# Patient Record
Sex: FEMALE | Race: OTHER | Employment: UNEMPLOYED | URBAN - NONMETROPOLITAN AREA
[De-identification: names, ages, dates, MRNs, and addresses within clinical notes are randomized per-mention and may not be internally consistent; named-entity substitution may affect disease eponyms.]

---

## 2020-02-10 ENCOUNTER — HOSPITAL ENCOUNTER (EMERGENCY)
Age: 55
Discharge: HOME | End: 2020-02-10
Payer: COMMERCIAL

## 2020-02-10 VITALS
HEIGHT: 64 IN | BODY MASS INDEX: 28.17 KG/M2 | SYSTOLIC BLOOD PRESSURE: 138 MMHG | OXYGEN SATURATION: 98 % | RESPIRATION RATE: 16 BRPM | WEIGHT: 165 LBS | TEMPERATURE: 98.6 F | DIASTOLIC BLOOD PRESSURE: 72 MMHG | HEART RATE: 92 BPM

## 2020-02-10 PROCEDURE — 99202 OFFICE O/P NEW SF 15 MIN: CPT

## 2020-02-10 PROCEDURE — 69209 REMOVE IMPACTED EAR WAX UNI: CPT

## 2020-02-10 PROCEDURE — 99202 OFFICE O/P NEW SF 15 MIN: CPT | Performed by: NURSE PRACTITIONER

## 2020-02-10 RX ORDER — LORATADINE 10 MG/1
10 TABLET ORAL DAILY PRN
Qty: 30 TABLET | Refills: 0 | Status: SHIPPED | OUTPATIENT
Start: 2020-02-10

## 2020-02-10 RX ORDER — AMOXICILLIN 500 MG/1
500 CAPSULE ORAL 3 TIMES DAILY
Qty: 30 CAPSULE | Refills: 0 | Status: ON HOLD
Start: 2020-02-10 | End: 2020-02-19 | Stop reason: HOSPADM

## 2020-02-10 RX ORDER — LISINOPRIL AND HYDROCHLOROTHIAZIDE 25; 20 MG/1; MG/1
1 TABLET ORAL DAILY
COMMUNITY

## 2020-02-10 RX ORDER — OFLOXACIN 3 MG/ML
10 SOLUTION AURICULAR (OTIC) 2 TIMES DAILY
Qty: 1 BOTTLE | Refills: 0 | Status: SHIPPED | OUTPATIENT
Start: 2020-02-10 | End: 2020-02-10 | Stop reason: SDUPTHER

## 2020-02-10 RX ORDER — OFLOXACIN 3 MG/ML
10 SOLUTION AURICULAR (OTIC) 2 TIMES DAILY
Qty: 1 BOTTLE | Refills: 0 | Status: SHIPPED | OUTPATIENT
Start: 2020-02-10 | End: 2020-02-20

## 2020-02-10 RX ORDER — FLUTICASONE PROPIONATE 50 MCG
1 SPRAY, SUSPENSION (ML) NASAL DAILY
Qty: 1 BOTTLE | Refills: 0 | Status: SHIPPED | OUTPATIENT
Start: 2020-02-10

## 2020-02-10 ASSESSMENT — ENCOUNTER SYMPTOMS
RHINORRHEA: 1
NAUSEA: 0
VOMITING: 0
TROUBLE SWALLOWING: 0
SINUS PRESSURE: 0
SORE THROAT: 0
ABDOMINAL PAIN: 0
CHEST TIGHTNESS: 0
STRIDOR: 0
SHORTNESS OF BREATH: 0
EYES NEGATIVE: 1
COUGH: 1
VOICE CHANGE: 0
SINUS PAIN: 0
WHEEZING: 0

## 2020-02-11 NOTE — ED NOTES
Right ear flush completed with no problems. Moderate amount of cerumen returned from right ear.      Eduardo Sandhu RN  02/10/20 1943

## 2020-02-11 NOTE — ED PROVIDER NOTES
CHANGED    Details   lisinopril-hydrochlorothiazide (PRINZIDE;ZESTORETIC) 20-25 MG per tablet Take 1 tablet by mouth dailyHistorical Med             ALLERGIES     Patient is has No Known Allergies. FAMILY HISTORY     Patient'sfamily history is not on file. SOCIAL HISTORY     Patient  reports that she is a non-smoker but has been exposed to tobacco smoke. She has never used smokeless tobacco. She reports current alcohol use. She reports that she does not use drugs. PHYSICAL EXAM     ED TRIAGE VITALS  BP: 138/72, Temp: 98.6 °F (37 °C), Pulse: 92, Resp: 16, SpO2: 98 %  Physical Exam  Vitals signs and nursing note reviewed. Constitutional:       General: She is not in acute distress. Appearance: Normal appearance. She is well-developed and well-groomed. She is not ill-appearing, toxic-appearing or diaphoretic. HENT:      Head: Normocephalic and atraumatic. Right Ear: Hearing, ear canal and external ear normal. Drainage (serous) present. No swelling or tenderness. No middle ear effusion. There is impacted cerumen (plug noted). No foreign body. No mastoid tenderness. No hemotympanum. Tympanic membrane is not injected, scarred, perforated, erythematous, retracted or bulging. Left Ear: Hearing, ear canal and external ear normal. No drainage, swelling or tenderness. A middle ear effusion is present. There is no impacted cerumen. No foreign body. No mastoid tenderness. Nose: Nose normal.      Right Sinus: No maxillary sinus tenderness or frontal sinus tenderness. Left Sinus: No maxillary sinus tenderness or frontal sinus tenderness. Mouth/Throat:      Lips: Pink. No lesions. Mouth: Mucous membranes are moist. No oral lesions. Tongue: No lesions. Palate: No lesions. Pharynx: Oropharynx is clear. Uvula midline. Posterior oropharyngeal erythema (mild) present. No pharyngeal swelling, oropharyngeal exudate or uvula swelling.       Tonsils: No tonsillar exudate or tonsillar abscesses. Swellin+ on the right. 2+ on the left. Eyes:      Conjunctiva/sclera: Conjunctivae normal.      Right eye: Right conjunctiva is not injected. Left eye: Left conjunctiva is not injected. Pupils: Pupils are equal.   Neck:      Musculoskeletal: Normal range of motion. Cardiovascular:      Rate and Rhythm: Normal rate and regular rhythm. Heart sounds: Normal heart sounds. Pulmonary:      Effort: Pulmonary effort is normal. No respiratory distress. Breath sounds: Normal breath sounds and air entry. No stridor, decreased air movement or transmitted upper airway sounds. No decreased breath sounds, wheezing, rhonchi or rales. Musculoskeletal:      Right knee: She exhibits normal range of motion. Left knee: She exhibits normal range of motion. Lymphadenopathy:      Cervical: Cervical adenopathy present. Right cervical: Deep cervical adenopathy present. Left cervical: Deep cervical adenopathy present. Skin:     General: Skin is warm and dry. Capillary Refill: Capillary refill takes less than 2 seconds. Coloration: Skin is not pale. Comments: No obvious signs of infection or trauma. Neurological:      Mental Status: She is alert and oriented to person, place, and time. Sensory: No sensory deficit. Psychiatric:         Behavior: Behavior normal. Behavior is cooperative. DIAGNOSTIC RESULTS   Labs:No results found for this visit on 02/10/20. IMAGING:  No orders to display     URGENT CARE COURSE:     Vitals:    02/10/20 1922 02/10/20 1924 02/10/20 1943   BP:  (!) 156/76 138/72   Pulse: 100  92   Resp: 16  16   Temp: 98.6 °F (37 °C)     TempSrc: Oral     SpO2: 98%  98%   Weight: 165 lb (74.8 kg)     Height: 5' 4\" (1.626 m)         Medications - No data to display  PROCEDURES:  Ear Wax Removal  Date/Time: 2/10/2020 7:42 PM  Performed by: Emilio Rivero RN  Authorized by:  IGNACIO Dodd - CNP     Consent:     Consent obtained:  Verbal and written    Consent given by:  Patient    Risks discussed:  Bleeding, dizziness, infection, incomplete removal, pain and TM perforation  Procedure details:     Location:  R ear    Procedure type: irrigation    Post-procedure details: Inspection:  TM intact and macerated skin (serous fluid noted)    Hearing quality:  Improved (states \" i can hear\". )    Patient tolerance of procedure: Tolerated well, no immediate complications      FINALIMPRESSION      1. Acute viral sinusitis    2. Infective otitis externa of right ear    3. Non-recurrent acute suppurative otitis media of right ear without spontaneous rupture of tympanic membrane    4. Impacted cerumen of right ear        DISPOSITION/PLAN   DISPOSITION    Dry ears  No qtips           Problem List Items Addressed This Visit     None      Visit Diagnoses     Acute viral sinusitis    -  Primary    Relevant Medications    amoxicillin (AMOXIL) 500 MG capsule    fluticasone (FLONASE) 50 MCG/ACT nasal spray    loratadine (CLARITIN) 10 MG tablet    Infective otitis externa of right ear        Relevant Medications    fluticasone (FLONASE) 50 MCG/ACT nasal spray    ofloxacin (FLOXIN) 0.3 % otic solution    Non-recurrent acute suppurative otitis media of right ear without spontaneous rupture of tympanic membrane        Relevant Medications    amoxicillin (AMOXIL) 500 MG capsule    Impacted cerumen of right ear        Relevant Medications    ofloxacin (FLOXIN) 0.3 % otic solution    Other Relevant Orders    Ear wax removal (Completed)    EAR CERUMEN REMOVAL (Completed)          PATIENT REFERRED TO:  [address] [facility] [address]  [address] [address] [address] [address]  Schedule an appointment as soon as possible for a visit in 1 week  if no improvement      Dain Rodriguez, IGNACIO - [facility], APRN - CNP  02/10/20 [id number]

## 2020-02-17 ENCOUNTER — APPOINTMENT (OUTPATIENT)
Dept: GENERAL RADIOLOGY | Age: 55
DRG: 392 | End: 2020-02-17
Payer: COMMERCIAL

## 2020-02-17 ENCOUNTER — APPOINTMENT (OUTPATIENT)
Dept: CT IMAGING | Age: 55
DRG: 392 | End: 2020-02-17
Payer: COMMERCIAL

## 2020-02-17 ENCOUNTER — HOSPITAL ENCOUNTER (EMERGENCY)
Age: 55
Discharge: HOME | DRG: 392 | End: 2020-02-17
Payer: COMMERCIAL

## 2020-02-17 VITALS
OXYGEN SATURATION: 98 % | WEIGHT: 165 LBS | DIASTOLIC BLOOD PRESSURE: 81 MMHG | HEIGHT: 64 IN | SYSTOLIC BLOOD PRESSURE: 138 MMHG | BODY MASS INDEX: 28.17 KG/M2 | RESPIRATION RATE: 20 BRPM | HEART RATE: 86 BPM | TEMPERATURE: 97.3 F

## 2020-02-17 LAB
ALBUMIN SERPL-MCNC: 4.3 G/DL (ref 3.5–5.1)
ALP BLD-CCNC: 102 U/L (ref 38–126)
ALT SERPL-CCNC: 27 U/L (ref 11–66)
AMORPHOUS: ABNORMAL
ANION GAP SERPL CALCULATED.3IONS-SCNC: 18 MEQ/L (ref 8–16)
AST SERPL-CCNC: 17 U/L (ref 5–40)
BACTERIA: ABNORMAL /HPF
BASOPHILS # BLD: 0.3 %
BASOPHILS ABSOLUTE: 0 THOU/MM3 (ref 0–0.1)
BILIRUB SERPL-MCNC: 0.4 MG/DL (ref 0.3–1.2)
BILIRUBIN URINE: NEGATIVE
BLOOD, URINE: ABNORMAL
BUN BLDV-MCNC: 15 MG/DL (ref 7–22)
CALCIUM SERPL-MCNC: 9.8 MG/DL (ref 8.5–10.5)
CASTS 2: ABNORMAL /LPF
CASTS UA: ABNORMAL /LPF
CHARACTER, URINE: ABNORMAL
CHLORIDE BLD-SCNC: 101 MEQ/L (ref 98–111)
CO2: 23 MEQ/L (ref 23–33)
COLOR: YELLOW
CREAT SERPL-MCNC: 0.6 MG/DL (ref 0.4–1.2)
CRYSTALS, UA: ABNORMAL
EKG ATRIAL RATE: 86 BPM
EKG P AXIS: 55 DEGREES
EKG P-R INTERVAL: 178 MS
EKG Q-T INTERVAL: 400 MS
EKG QRS DURATION: 84 MS
EKG QTC CALCULATION (BAZETT): 478 MS
EKG R AXIS: 38 DEGREES
EKG T AXIS: 26 DEGREES
EKG VENTRICULAR RATE: 86 BPM
EOSINOPHIL # BLD: 0.4 %
EOSINOPHILS ABSOLUTE: 0 THOU/MM3 (ref 0–0.4)
EPITHELIAL CELLS, UA: ABNORMAL /HPF
ERYTHROCYTE [DISTWIDTH] IN BLOOD BY AUTOMATED COUNT: 13 % (ref 11.5–14.5)
ERYTHROCYTE [DISTWIDTH] IN BLOOD BY AUTOMATED COUNT: 42.1 FL (ref 35–45)
FLU A ANTIGEN: NEGATIVE
FLU B ANTIGEN: NEGATIVE
GFR SERPL CREATININE-BSD FRML MDRD: > 90 ML/MIN/1.73M2
GLUCOSE BLD-MCNC: 237 MG/DL (ref 70–108)
GLUCOSE URINE: >= 1000 MG/DL
HCT VFR BLD CALC: 43.9 % (ref 37–47)
HEMOCCULT STL QL: POSITIVE
HEMOGLOBIN: 14.7 GM/DL (ref 12–16)
IMMATURE GRANS (ABS): 0.05 THOU/MM3 (ref 0–0.07)
IMMATURE GRANULOCYTES: 0.5 %
KETONES, URINE: 40
LEUKOCYTE ESTERASE, URINE: NEGATIVE
LIPASE: 24.8 U/L (ref 5.6–51.3)
LYMPHOCYTES # BLD: 25.3 %
LYMPHOCYTES ABSOLUTE: 2.5 THOU/MM3 (ref 1–4.8)
MCH RBC QN AUTO: 30 PG (ref 26–33)
MCHC RBC AUTO-ENTMCNC: 33.5 GM/DL (ref 32.2–35.5)
MCV RBC AUTO: 89.6 FL (ref 81–99)
MISCELLANEOUS 2: ABNORMAL
MONOCYTES # BLD: 3 %
MONOCYTES ABSOLUTE: 0.3 THOU/MM3 (ref 0.4–1.3)
NITRITE, URINE: NEGATIVE
NUCLEATED RED BLOOD CELLS: 0 /100 WBC
OSMOLALITY CALCULATION: 291.6 MOSMOL/KG (ref 275–300)
PH UA: 5 (ref 5–9)
PLATELET # BLD: 310 THOU/MM3 (ref 130–400)
PMV BLD AUTO: 10 FL (ref 9.4–12.4)
POTASSIUM REFLEX MAGNESIUM: 3.8 MEQ/L (ref 3.5–5.2)
PRO-BNP: 17.6 PG/ML (ref 0–900)
PROTEIN UA: ABNORMAL
RBC # BLD: 4.9 MILL/MM3 (ref 4.2–5.4)
RBC URINE: ABNORMAL /HPF
RENAL EPITHELIAL, UA: ABNORMAL
SEG NEUTROPHILS: 70.5 %
SEGMENTED NEUTROPHILS ABSOLUTE COUNT: 7 THOU/MM3 (ref 1.8–7.7)
SODIUM BLD-SCNC: 142 MEQ/L (ref 135–145)
SPECIFIC GRAVITY, URINE: > 1.03 (ref 1–1.03)
TOTAL PROTEIN: 7.8 G/DL (ref 6.1–8)
TROPONIN T: < 0.01 NG/ML
TROPONIN T: < 0.01 NG/ML
UROBILINOGEN, URINE: 0.2 EU/DL (ref 0–1)
WBC # BLD: 9.9 THOU/MM3 (ref 4.8–10.8)
WBC UA: ABNORMAL /HPF
YEAST: ABNORMAL

## 2020-02-17 PROCEDURE — 6360000004 HC RX CONTRAST MEDICATION: Performed by: PHYSICIAN ASSISTANT

## 2020-02-17 PROCEDURE — 96375 TX/PRO/DX INJ NEW DRUG ADDON: CPT

## 2020-02-17 PROCEDURE — 82272 OCCULT BLD FECES 1-3 TESTS: CPT

## 2020-02-17 PROCEDURE — 6370000000 HC RX 637 (ALT 250 FOR IP): Performed by: PHYSICIAN ASSISTANT

## 2020-02-17 PROCEDURE — 80053 COMPREHEN METABOLIC PANEL: CPT

## 2020-02-17 PROCEDURE — 84484 ASSAY OF TROPONIN QUANT: CPT

## 2020-02-17 PROCEDURE — 36415 COLL VENOUS BLD VENIPUNCTURE: CPT

## 2020-02-17 PROCEDURE — 85025 COMPLETE CBC W/AUTO DIFF WBC: CPT

## 2020-02-17 PROCEDURE — 99285 EMERGENCY DEPT VISIT HI MDM: CPT

## 2020-02-17 PROCEDURE — 83690 ASSAY OF LIPASE: CPT

## 2020-02-17 PROCEDURE — 6360000002 HC RX W HCPCS: Performed by: PHYSICIAN ASSISTANT

## 2020-02-17 PROCEDURE — 87804 INFLUENZA ASSAY W/OPTIC: CPT

## 2020-02-17 PROCEDURE — 93005 ELECTROCARDIOGRAM TRACING: CPT | Performed by: PHYSICIAN ASSISTANT

## 2020-02-17 PROCEDURE — 2500000003 HC RX 250 WO HCPCS: Performed by: PHYSICIAN ASSISTANT

## 2020-02-17 PROCEDURE — 74177 CT ABD & PELVIS W/CONTRAST: CPT

## 2020-02-17 PROCEDURE — 83880 ASSAY OF NATRIURETIC PEPTIDE: CPT

## 2020-02-17 PROCEDURE — 81001 URINALYSIS AUTO W/SCOPE: CPT

## 2020-02-17 PROCEDURE — 71046 X-RAY EXAM CHEST 2 VIEWS: CPT

## 2020-02-17 PROCEDURE — 96374 THER/PROPH/DIAG INJ IV PUSH: CPT

## 2020-02-17 PROCEDURE — 2580000003 HC RX 258: Performed by: PHYSICIAN ASSISTANT

## 2020-02-17 RX ORDER — ONDANSETRON 2 MG/ML
4 INJECTION INTRAMUSCULAR; INTRAVENOUS ONCE
Status: COMPLETED | OUTPATIENT
Start: 2020-02-17 | End: 2020-02-17

## 2020-02-17 RX ORDER — SUCRALFATE 1 G/1
1 TABLET ORAL ONCE
Status: COMPLETED | OUTPATIENT
Start: 2020-02-17 | End: 2020-02-17

## 2020-02-17 RX ORDER — CIPROFLOXACIN 500 MG/1
500 TABLET, FILM COATED ORAL 2 TIMES DAILY
Qty: 20 TABLET | Refills: 0 | Status: SHIPPED | OUTPATIENT
Start: 2020-02-17 | End: 2020-02-27

## 2020-02-17 RX ORDER — METRONIDAZOLE 500 MG/1
500 TABLET ORAL ONCE
Status: COMPLETED | OUTPATIENT
Start: 2020-02-17 | End: 2020-02-17

## 2020-02-17 RX ORDER — DICYCLOMINE HYDROCHLORIDE 10 MG/1
10 CAPSULE ORAL EVERY 6 HOURS PRN
Qty: 20 CAPSULE | Refills: 0 | Status: SHIPPED | OUTPATIENT
Start: 2020-02-17

## 2020-02-17 RX ORDER — ONDANSETRON 4 MG/1
4 TABLET, ORALLY DISINTEGRATING ORAL EVERY 8 HOURS PRN
Qty: 20 TABLET | Refills: 0 | Status: SHIPPED | OUTPATIENT
Start: 2020-02-17

## 2020-02-17 RX ORDER — METRONIDAZOLE 500 MG/1
500 TABLET ORAL 2 TIMES DAILY
Qty: 20 TABLET | Refills: 0 | Status: SHIPPED | OUTPATIENT
Start: 2020-02-17 | End: 2020-02-27

## 2020-02-17 RX ORDER — SODIUM CHLORIDE 9 MG/ML
INJECTION, SOLUTION INTRAVENOUS CONTINUOUS
Status: DISCONTINUED | OUTPATIENT
Start: 2020-02-17 | End: 2020-02-17 | Stop reason: HOSPADM

## 2020-02-17 RX ORDER — LOPERAMIDE HYDROCHLORIDE 2 MG/1
2 CAPSULE ORAL 4 TIMES DAILY PRN
Qty: 20 CAPSULE | Refills: 0 | Status: SHIPPED | OUTPATIENT
Start: 2020-02-17 | End: 2020-02-22

## 2020-02-17 RX ORDER — CIPROFLOXACIN 500 MG/1
500 TABLET, FILM COATED ORAL ONCE
Status: COMPLETED | OUTPATIENT
Start: 2020-02-17 | End: 2020-02-17

## 2020-02-17 RX ORDER — 0.9 % SODIUM CHLORIDE 0.9 %
1000 INTRAVENOUS SOLUTION INTRAVENOUS ONCE
Status: COMPLETED | OUTPATIENT
Start: 2020-02-17 | End: 2020-02-17

## 2020-02-17 RX ORDER — DICYCLOMINE HYDROCHLORIDE 10 MG/1
10 CAPSULE ORAL ONCE
Status: COMPLETED | OUTPATIENT
Start: 2020-02-17 | End: 2020-02-17

## 2020-02-17 RX ADMIN — SODIUM CHLORIDE 1000 ML: 9 INJECTION, SOLUTION INTRAVENOUS at 14:33

## 2020-02-17 RX ADMIN — IOPAMIDOL 80 ML: 755 INJECTION, SOLUTION INTRAVENOUS at 16:07

## 2020-02-17 RX ADMIN — CIPROFLOXACIN 500 MG: 500 TABLET, FILM COATED ORAL at 17:38

## 2020-02-17 RX ADMIN — METRONIDAZOLE 500 MG: 500 TABLET, FILM COATED ORAL at 17:38

## 2020-02-17 RX ADMIN — SUCRALFATE 1 G: 1 TABLET ORAL at 14:39

## 2020-02-17 RX ADMIN — ONDANSETRON 4 MG: 2 INJECTION INTRAMUSCULAR; INTRAVENOUS at 14:34

## 2020-02-17 RX ADMIN — FAMOTIDINE 20 MG: 10 INJECTION, SOLUTION INTRAVENOUS at 14:37

## 2020-02-17 RX ADMIN — LIDOCAINE HYDROCHLORIDE: 20 SOLUTION ORAL; TOPICAL at 14:39

## 2020-02-17 RX ADMIN — DICYCLOMINE HYDROCHLORIDE 10 MG: 10 CAPSULE ORAL at 14:39

## 2020-02-17 ASSESSMENT — PAIN DESCRIPTION - PAIN TYPE
TYPE: ACUTE PAIN
TYPE: ACUTE PAIN

## 2020-02-17 ASSESSMENT — ENCOUNTER SYMPTOMS
RHINORRHEA: 0
SHORTNESS OF BREATH: 0
COLOR CHANGE: 0
NAUSEA: 1
CONSTIPATION: 0
BLOOD IN STOOL: 1
SORE THROAT: 0
WHEEZING: 0
BACK PAIN: 0
DIARRHEA: 1

## 2020-02-17 ASSESSMENT — PAIN DESCRIPTION - LOCATION: LOCATION: ABDOMEN

## 2020-02-17 NOTE — ED PROVIDER NOTES
Exam  Vitals signs and nursing note reviewed. Exam conducted with a chaperone present. Constitutional:       General: She is not in acute distress. Appearance: Normal appearance. She is well-developed. She is not ill-appearing, toxic-appearing or diaphoretic. HENT:      Head: Normocephalic and atraumatic. Right Ear: External ear normal.      Left Ear: External ear normal.      Nose: Nose normal.      Mouth/Throat:      Mouth: Mucous membranes are moist.      Pharynx: Oropharynx is clear. Eyes:      General: No scleral icterus. Right eye: No discharge. Left eye: No discharge. Conjunctiva/sclera: Conjunctivae normal.      Pupils: Pupils are equal, round, and reactive to light. Neck:      Musculoskeletal: Normal range of motion. Cardiovascular:      Rate and Rhythm: Normal rate and regular rhythm. Heart sounds: Normal heart sounds. No murmur. No friction rub. No gallop. Pulmonary:      Effort: Pulmonary effort is normal. No respiratory distress. Breath sounds: Normal breath sounds. No stridor. No wheezing, rhonchi or rales. Chest:      Chest wall: No tenderness. Abdominal:      General: Bowel sounds are normal. There is no distension. Palpations: Abdomen is soft. There is no mass. Tenderness: There is abdominal tenderness in the epigastric area. There is no guarding or rebound. Hernia: No hernia is present. Genitourinary:     Rectum: Guaiac result positive. No mass, tenderness, anal fissure, external hemorrhoid or internal hemorrhoid. Normal anal tone. Comments: There was no gross blood on the glove of the exam finger following the rectal exam.  Musculoskeletal: Normal range of motion. Skin:     General: Skin is warm and dry. Coloration: Skin is not pale. Findings: No erythema or rash. Neurological:      Mental Status: She is alert and oriented to person, place, and time. GCS: GCS eye subscore is 4.  GCS verbal subscore is Result   No acute disease. **This report has been created using voice recognition software. It may contain minor errors which are inherent in voice recognition technology. **      Final report electronically signed by Dr. Gege Hickman on 2/17/2020 3:21 PM          LABS:     Labs Reviewed   CBC WITH AUTO DIFFERENTIAL - Abnormal; Notable for the following components:       Result Value    Monocytes Absolute 0.3 (*)     All other components within normal limits   COMPREHENSIVE METABOLIC PANEL W/ REFLEX TO MG FOR LOW K - Abnormal; Notable for the following components:    Glucose 237 (*)     All other components within normal limits   ANION GAP - Abnormal; Notable for the following components:    Anion Gap 18.0 (*)     All other components within normal limits   URINE WITH REFLEXED MICRO - Abnormal; Notable for the following components:    Glucose, Ur >= 1000 (*)     Ketones, Urine 40 (*)     Specific Gravity, Urine > 1.030 (*)     Blood, Urine MODERATE (*)     Protein, UA TRACE (*)     Character, Urine TURBID (*)     All other components within normal limits   RAPID INFLUENZA A/B ANTIGENS   GASTROINTESTINAL PANEL, MOLECULAR   LIPASE   BLOOD OCCULT STOOL SCREEN #1   BRAIN NATRIURETIC PEPTIDE   TROPONIN   GLOMERULAR FILTRATION RATE, ESTIMATED   OSMOLALITY   TROPONIN       EMERGENCY DEPARTMENT COURSE:   Vitals:    Vitals:    02/17/20 1349 02/17/20 1353 02/17/20 1402 02/17/20 1521   BP: (!) 162/68  (!) 127/55 138/81   Pulse: 97   86   Resp: 18   20   Temp:  97.3 °F (36.3 °C)     TempSrc: Oral Oral     SpO2: 100%  98% 98%   Weight: 165 lb (74.8 kg)      Height: 5' 4\" (1.626 m)          MDM:  The patient was seen and evaluated within the ED today with abdominal pain, vomiting, and diarrhea. Within the department, I observed the patient's vital signs to be within acceptable range, and she was afebrile. On exam, I appreciated epigastric abdominal tenderness without guarding, rigidity, or rebound tenderness. AM    Provider:  I personally performed the services described in the documentation, reviewed and edited the documentation which was dictated to the scribe in my presence, and it accurately records my words and actions.     Krystal Danielson PA-C 2/17/20 9:18 AM        Krystal Danielson PA-C  02/18/20 9226

## 2020-02-17 NOTE — ED NOTES
ALESSIA Campos at bedside to update on POC and results. Pt to and from bathroom. Gait steady. Rprts all symptoms improved.       Misael Luna RN  02/17/20 8255

## 2020-02-17 NOTE — ED TRIAGE NOTES
Pt arrives to ED with c/o abdominal pain, vomiting and diarrhea x 1 week. Pt states she has not been able to keep anything down and has not taken any medications to treat her symptoms. Pt states she thinks she has food poisoning. Pt sitting in wheelchair, respirations unlabored. Family in room.

## 2020-02-18 ENCOUNTER — HOSPITAL ENCOUNTER (INPATIENT)
Age: 55
LOS: 1 days | Discharge: HOME HEALTH | DRG: 392 | End: 2020-02-19
Attending: EMERGENCY MEDICINE | Admitting: INTERNAL MEDICINE
Payer: COMMERCIAL

## 2020-02-18 ENCOUNTER — APPOINTMENT (OUTPATIENT)
Dept: CT IMAGING | Age: 55
DRG: 392 | End: 2020-02-18
Payer: COMMERCIAL

## 2020-02-18 PROBLEM — K52.9 COLITIS: Status: ACTIVE | Noted: 2020-02-18

## 2020-02-18 LAB
ALBUMIN SERPL-MCNC: 4.4 G/DL (ref 3.5–5.1)
ALP BLD-CCNC: 99 U/L (ref 38–126)
ALT SERPL-CCNC: 26 U/L (ref 11–66)
AMPHETAMINE+METHAMPHETAMINE URINE SCREEN: NEGATIVE
ANION GAP SERPL CALCULATED.3IONS-SCNC: 17 MEQ/L (ref 8–16)
APTT: 34.2 SECONDS (ref 22–38)
AST SERPL-CCNC: 21 U/L (ref 5–40)
BACTERIA: ABNORMAL /HPF
BARBITURATE QUANTITATIVE URINE: NEGATIVE
BASOPHILS # BLD: 0.2 %
BASOPHILS ABSOLUTE: 0 THOU/MM3 (ref 0–0.1)
BENZODIAZEPINE QUANTITATIVE URINE: NEGATIVE
BILIRUB SERPL-MCNC: 0.3 MG/DL (ref 0.3–1.2)
BILIRUBIN DIRECT: < 0.2 MG/DL (ref 0–0.3)
BILIRUBIN URINE: NEGATIVE
BLOOD, URINE: ABNORMAL
BUN BLDV-MCNC: 13 MG/DL (ref 7–22)
CALCIUM SERPL-MCNC: 9.3 MG/DL (ref 8.5–10.5)
CANNABINOID QUANTITATIVE URINE: POSITIVE
CASTS 2: ABNORMAL /LPF
CASTS UA: ABNORMAL /LPF
CHARACTER, URINE: CLEAR
CHLORIDE BLD-SCNC: 96 MEQ/L (ref 98–111)
CO2: 21 MEQ/L (ref 23–33)
COCAINE METABOLITE QUANTITATIVE URINE: NEGATIVE
COLOR: YELLOW
CREAT SERPL-MCNC: 0.6 MG/DL (ref 0.4–1.2)
CRYSTALS, UA: ABNORMAL
EKG ATRIAL RATE: 79 BPM
EKG P AXIS: 49 DEGREES
EKG P-R INTERVAL: 170 MS
EKG Q-T INTERVAL: 396 MS
EKG QRS DURATION: 84 MS
EKG QTC CALCULATION (BAZETT): 454 MS
EKG R AXIS: 46 DEGREES
EKG T AXIS: 37 DEGREES
EKG VENTRICULAR RATE: 79 BPM
EOSINOPHIL # BLD: 0.8 %
EOSINOPHILS ABSOLUTE: 0.1 THOU/MM3 (ref 0–0.4)
EPITHELIAL CELLS, UA: ABNORMAL /HPF
ERYTHROCYTE [DISTWIDTH] IN BLOOD BY AUTOMATED COUNT: 13.1 % (ref 11.5–14.5)
ERYTHROCYTE [DISTWIDTH] IN BLOOD BY AUTOMATED COUNT: 42.7 FL (ref 35–45)
ETHYL ALCOHOL, SERUM: < 0.01 %
GFR SERPL CREATININE-BSD FRML MDRD: > 90 ML/MIN/1.73M2
GLUCOSE BLD-MCNC: 264 MG/DL (ref 70–108)
GLUCOSE URINE: >= 1000 MG/DL
HCT VFR BLD CALC: 42.3 % (ref 37–47)
HEMOCCULT STL QL: NEGATIVE
HEMOGLOBIN: 14.4 GM/DL (ref 12–16)
IMMATURE GRANS (ABS): 0.05 THOU/MM3 (ref 0–0.07)
IMMATURE GRANULOCYTES: 0.4 %
INR BLD: 0.98 (ref 0.85–1.13)
KETONES, URINE: 80
LEUKOCYTE ESTERASE, URINE: NEGATIVE
LIPASE: 32.7 U/L (ref 5.6–51.3)
LYMPHOCYTES # BLD: 32.4 %
LYMPHOCYTES ABSOLUTE: 4 THOU/MM3 (ref 1–4.8)
MAGNESIUM: 1.8 MG/DL (ref 1.6–2.4)
MCH RBC QN AUTO: 30.4 PG (ref 26–33)
MCHC RBC AUTO-ENTMCNC: 34 GM/DL (ref 32.2–35.5)
MCV RBC AUTO: 89.4 FL (ref 81–99)
MISCELLANEOUS 2: ABNORMAL
MONOCYTES # BLD: 4.2 %
MONOCYTES ABSOLUTE: 0.5 THOU/MM3 (ref 0.4–1.3)
NITRITE, URINE: NEGATIVE
NUCLEATED RED BLOOD CELLS: 0 /100 WBC
OPIATES, URINE: NEGATIVE
OSMOLALITY CALCULATION: 277.5 MOSMOL/KG (ref 275–300)
OXYCODONE: NEGATIVE
PH UA: 5 (ref 5–9)
PHENCYCLIDINE QUANTITATIVE URINE: NEGATIVE
PLATELET # BLD: 333 THOU/MM3 (ref 130–400)
PMV BLD AUTO: 10.2 FL (ref 9.4–12.4)
POTASSIUM SERPL-SCNC: 3.5 MEQ/L (ref 3.5–5.2)
PRO-BNP: 19 PG/ML (ref 0–900)
PROTEIN UA: ABNORMAL
RBC # BLD: 4.73 MILL/MM3 (ref 4.2–5.4)
RBC URINE: ABNORMAL /HPF
RENAL EPITHELIAL, UA: ABNORMAL
SEG NEUTROPHILS: 62 %
SEGMENTED NEUTROPHILS ABSOLUTE COUNT: 7.6 THOU/MM3 (ref 1.8–7.7)
SODIUM BLD-SCNC: 134 MEQ/L (ref 135–145)
SPECIFIC GRAVITY, URINE: > 1.03 (ref 1–1.03)
TOTAL PROTEIN: 7.8 G/DL (ref 6.1–8)
TROPONIN T: < 0.01 NG/ML
UROBILINOGEN, URINE: 0.2 EU/DL (ref 0–1)
WBC # BLD: 12.2 THOU/MM3 (ref 4.8–10.8)
WBC UA: ABNORMAL /HPF
YEAST: ABNORMAL

## 2020-02-18 PROCEDURE — 80307 DRUG TEST PRSMV CHEM ANLYZR: CPT

## 2020-02-18 PROCEDURE — 2500000003 HC RX 250 WO HCPCS: Performed by: EMERGENCY MEDICINE

## 2020-02-18 PROCEDURE — 36415 COLL VENOUS BLD VENIPUNCTURE: CPT

## 2020-02-18 PROCEDURE — 6360000004 HC RX CONTRAST MEDICATION: Performed by: EMERGENCY MEDICINE

## 2020-02-18 PROCEDURE — 83735 ASSAY OF MAGNESIUM: CPT

## 2020-02-18 PROCEDURE — 1200000003 HC TELEMETRY R&B

## 2020-02-18 PROCEDURE — 99223 1ST HOSP IP/OBS HIGH 75: CPT | Performed by: INTERNAL MEDICINE

## 2020-02-18 PROCEDURE — 93005 ELECTROCARDIOGRAM TRACING: CPT | Performed by: EMERGENCY MEDICINE

## 2020-02-18 PROCEDURE — 6370000000 HC RX 637 (ALT 250 FOR IP): Performed by: INTERNAL MEDICINE

## 2020-02-18 PROCEDURE — 81001 URINALYSIS AUTO W/SCOPE: CPT

## 2020-02-18 PROCEDURE — G0480 DRUG TEST DEF 1-7 CLASSES: HCPCS

## 2020-02-18 PROCEDURE — 99285 EMERGENCY DEPT VISIT HI MDM: CPT

## 2020-02-18 PROCEDURE — 85025 COMPLETE CBC W/AUTO DIFF WBC: CPT

## 2020-02-18 PROCEDURE — 6360000002 HC RX W HCPCS: Performed by: INTERNAL MEDICINE

## 2020-02-18 PROCEDURE — 96361 HYDRATE IV INFUSION ADD-ON: CPT

## 2020-02-18 PROCEDURE — 84484 ASSAY OF TROPONIN QUANT: CPT

## 2020-02-18 PROCEDURE — 6370000000 HC RX 637 (ALT 250 FOR IP): Performed by: EMERGENCY MEDICINE

## 2020-02-18 PROCEDURE — 74177 CT ABD & PELVIS W/CONTRAST: CPT

## 2020-02-18 PROCEDURE — 85610 PROTHROMBIN TIME: CPT

## 2020-02-18 PROCEDURE — 6360000002 HC RX W HCPCS: Performed by: EMERGENCY MEDICINE

## 2020-02-18 PROCEDURE — 82248 BILIRUBIN DIRECT: CPT

## 2020-02-18 PROCEDURE — 96374 THER/PROPH/DIAG INJ IV PUSH: CPT

## 2020-02-18 PROCEDURE — 93010 ELECTROCARDIOGRAM REPORT: CPT | Performed by: INTERNAL MEDICINE

## 2020-02-18 PROCEDURE — 80053 COMPREHEN METABOLIC PANEL: CPT

## 2020-02-18 PROCEDURE — 2580000003 HC RX 258: Performed by: EMERGENCY MEDICINE

## 2020-02-18 PROCEDURE — 85730 THROMBOPLASTIN TIME PARTIAL: CPT

## 2020-02-18 PROCEDURE — 2580000003 HC RX 258: Performed by: INTERNAL MEDICINE

## 2020-02-18 PROCEDURE — 83880 ASSAY OF NATRIURETIC PEPTIDE: CPT

## 2020-02-18 PROCEDURE — 94760 N-INVAS EAR/PLS OXIMETRY 1: CPT

## 2020-02-18 PROCEDURE — 82272 OCCULT BLD FECES 1-3 TESTS: CPT

## 2020-02-18 PROCEDURE — 83690 ASSAY OF LIPASE: CPT

## 2020-02-18 RX ORDER — POTASSIUM CHLORIDE 20 MEQ/1
40 TABLET, EXTENDED RELEASE ORAL PRN
Status: DISCONTINUED | OUTPATIENT
Start: 2020-02-18 | End: 2020-02-19 | Stop reason: HOSPADM

## 2020-02-18 RX ORDER — HYDROCHLOROTHIAZIDE 25 MG/1
25 TABLET ORAL DAILY
Status: DISCONTINUED | OUTPATIENT
Start: 2020-02-18 | End: 2020-02-18

## 2020-02-18 RX ORDER — CIPROFLOXACIN 2 MG/ML
400 INJECTION, SOLUTION INTRAVENOUS ONCE
Status: DISCONTINUED | OUTPATIENT
Start: 2020-02-18 | End: 2020-02-18

## 2020-02-18 RX ORDER — 0.9 % SODIUM CHLORIDE 0.9 %
1000 INTRAVENOUS SOLUTION INTRAVENOUS ONCE
Status: COMPLETED | OUTPATIENT
Start: 2020-02-18 | End: 2020-02-18

## 2020-02-18 RX ORDER — PANTOPRAZOLE SODIUM 40 MG/1
40 TABLET, DELAYED RELEASE ORAL ONCE
Status: COMPLETED | OUTPATIENT
Start: 2020-02-18 | End: 2020-02-18

## 2020-02-18 RX ORDER — CETIRIZINE HYDROCHLORIDE 10 MG/1
5 TABLET ORAL DAILY
Status: DISCONTINUED | OUTPATIENT
Start: 2020-02-18 | End: 2020-02-19 | Stop reason: HOSPADM

## 2020-02-18 RX ORDER — LISINOPRIL AND HYDROCHLOROTHIAZIDE 25; 20 MG/1; MG/1
1 TABLET ORAL DAILY
Status: DISCONTINUED | OUTPATIENT
Start: 2020-02-18 | End: 2020-02-18 | Stop reason: CLARIF

## 2020-02-18 RX ORDER — POTASSIUM CHLORIDE 7.45 MG/ML
10 INJECTION INTRAVENOUS PRN
Status: DISCONTINUED | OUTPATIENT
Start: 2020-02-18 | End: 2020-02-19 | Stop reason: HOSPADM

## 2020-02-18 RX ORDER — LISINOPRIL 20 MG/1
20 TABLET ORAL DAILY
Status: DISCONTINUED | OUTPATIENT
Start: 2020-02-18 | End: 2020-02-19 | Stop reason: HOSPADM

## 2020-02-18 RX ORDER — MORPHINE SULFATE 2 MG/ML
1 INJECTION, SOLUTION INTRAMUSCULAR; INTRAVENOUS EVERY 4 HOURS PRN
Status: DISCONTINUED | OUTPATIENT
Start: 2020-02-18 | End: 2020-02-18

## 2020-02-18 RX ORDER — SODIUM CHLORIDE 0.9 % (FLUSH) 0.9 %
10 SYRINGE (ML) INJECTION PRN
Status: DISCONTINUED | OUTPATIENT
Start: 2020-02-18 | End: 2020-02-19 | Stop reason: HOSPADM

## 2020-02-18 RX ORDER — LANOLIN ALCOHOL/MO/W.PET/CERES
3 CREAM (GRAM) TOPICAL NIGHTLY PRN
Status: DISCONTINUED | OUTPATIENT
Start: 2020-02-19 | End: 2020-02-19 | Stop reason: ALTCHOICE

## 2020-02-18 RX ORDER — ONDANSETRON 4 MG/1
4 TABLET, ORALLY DISINTEGRATING ORAL ONCE
Status: COMPLETED | OUTPATIENT
Start: 2020-02-18 | End: 2020-02-18

## 2020-02-18 RX ORDER — SODIUM CHLORIDE 0.9 % (FLUSH) 0.9 %
10 SYRINGE (ML) INJECTION EVERY 12 HOURS SCHEDULED
Status: DISCONTINUED | OUTPATIENT
Start: 2020-02-18 | End: 2020-02-19 | Stop reason: HOSPADM

## 2020-02-18 RX ORDER — AMLODIPINE BESYLATE 5 MG/1
5 TABLET ORAL DAILY
Status: DISCONTINUED | OUTPATIENT
Start: 2020-02-18 | End: 2020-02-19 | Stop reason: HOSPADM

## 2020-02-18 RX ORDER — MORPHINE SULFATE 2 MG/ML
1 INJECTION, SOLUTION INTRAMUSCULAR; INTRAVENOUS
Status: DISCONTINUED | OUTPATIENT
Start: 2020-02-18 | End: 2020-02-19 | Stop reason: HOSPADM

## 2020-02-18 RX ORDER — PREDNISONE 20 MG/1
40 TABLET ORAL DAILY
Status: DISCONTINUED | OUTPATIENT
Start: 2020-02-19 | End: 2020-02-18

## 2020-02-18 RX ORDER — SODIUM CHLORIDE 9 MG/ML
INJECTION, SOLUTION INTRAVENOUS CONTINUOUS
Status: DISCONTINUED | OUTPATIENT
Start: 2020-02-18 | End: 2020-02-19 | Stop reason: HOSPADM

## 2020-02-18 RX ORDER — POLYETHYLENE GLYCOL 3350 17 G/17G
238 POWDER, FOR SOLUTION ORAL ONCE
Status: COMPLETED | OUTPATIENT
Start: 2020-02-18 | End: 2020-02-18

## 2020-02-18 RX ORDER — FLUTICASONE PROPIONATE 50 MCG
1 SPRAY, SUSPENSION (ML) NASAL DAILY
Status: DISCONTINUED | OUTPATIENT
Start: 2020-02-18 | End: 2020-02-19 | Stop reason: HOSPADM

## 2020-02-18 RX ORDER — SODIUM CHLORIDE 0.9 % (FLUSH) 0.9 %
10 SYRINGE (ML) INJECTION PRN
Status: DISCONTINUED | OUTPATIENT
Start: 2020-02-18 | End: 2020-02-18 | Stop reason: SDUPTHER

## 2020-02-18 RX ORDER — MORPHINE SULFATE 4 MG/ML
4 INJECTION, SOLUTION INTRAMUSCULAR; INTRAVENOUS ONCE
Status: COMPLETED | OUTPATIENT
Start: 2020-02-18 | End: 2020-02-18

## 2020-02-18 RX ORDER — SODIUM CHLORIDE 0.9 % (FLUSH) 0.9 %
10 SYRINGE (ML) INJECTION EVERY 12 HOURS SCHEDULED
Status: DISCONTINUED | OUTPATIENT
Start: 2020-02-18 | End: 2020-02-18 | Stop reason: SDUPTHER

## 2020-02-18 RX ORDER — ONDANSETRON 2 MG/ML
4 INJECTION INTRAMUSCULAR; INTRAVENOUS EVERY 6 HOURS PRN
Status: DISCONTINUED | OUTPATIENT
Start: 2020-02-18 | End: 2020-02-19 | Stop reason: HOSPADM

## 2020-02-18 RX ADMIN — ENOXAPARIN SODIUM 40 MG: 40 INJECTION SUBCUTANEOUS at 14:12

## 2020-02-18 RX ADMIN — AMLODIPINE BESYLATE 5 MG: 5 TABLET ORAL at 14:10

## 2020-02-18 RX ADMIN — SODIUM CHLORIDE 1000 ML: 9 INJECTION, SOLUTION INTRAVENOUS at 05:40

## 2020-02-18 RX ADMIN — CETIRIZINE HYDROCHLORIDE 5 MG: 10 TABLET, FILM COATED ORAL at 14:10

## 2020-02-18 RX ADMIN — METRONIDAZOLE 500 MG: 500 INJECTION, SOLUTION INTRAVENOUS at 07:31

## 2020-02-18 RX ADMIN — BISACODYL 10 MG: 5 TABLET, COATED ORAL at 14:11

## 2020-02-18 RX ADMIN — ONDANSETRON 4 MG: 4 TABLET, ORALLY DISINTEGRATING ORAL at 05:38

## 2020-02-18 RX ADMIN — MORPHINE SULFATE 1 MG: 2 INJECTION, SOLUTION INTRAMUSCULAR; INTRAVENOUS at 19:28

## 2020-02-18 RX ADMIN — MORPHINE SULFATE 1 MG: 2 INJECTION, SOLUTION INTRAMUSCULAR; INTRAVENOUS at 15:47

## 2020-02-18 RX ADMIN — PIPERACILLIN AND TAZOBACTAM 3.38 G: 3; .375 INJECTION, POWDER, FOR SOLUTION INTRAVENOUS at 15:02

## 2020-02-18 RX ADMIN — PANTOPRAZOLE SODIUM 40 MG: 40 TABLET, DELAYED RELEASE ORAL at 05:39

## 2020-02-18 RX ADMIN — POLYETHYLENE GLYCOL 3350 238 G: 17 POWDER, FOR SOLUTION ORAL at 18:37

## 2020-02-18 RX ADMIN — IOPAMIDOL 85 ML: 755 INJECTION, SOLUTION INTRAVENOUS at 06:19

## 2020-02-18 RX ADMIN — FLUTICASONE PROPIONATE 1 SPRAY: 50 SPRAY, METERED NASAL at 15:02

## 2020-02-18 RX ADMIN — MORPHINE SULFATE 4 MG: 4 INJECTION, SOLUTION INTRAMUSCULAR; INTRAVENOUS at 05:38

## 2020-02-18 RX ADMIN — LISINOPRIL 20 MG: 20 TABLET ORAL at 15:06

## 2020-02-18 RX ADMIN — SODIUM CHLORIDE: 9 INJECTION, SOLUTION INTRAVENOUS at 10:40

## 2020-02-18 RX ADMIN — MORPHINE SULFATE 1 MG: 2 INJECTION, SOLUTION INTRAMUSCULAR; INTRAVENOUS at 11:27

## 2020-02-18 RX ADMIN — PIPERACILLIN AND TAZOBACTAM 3.38 G: 3; .375 INJECTION, POWDER, FOR SOLUTION INTRAVENOUS at 23:28

## 2020-02-18 RX ADMIN — ONDANSETRON 4 MG: 2 INJECTION INTRAMUSCULAR; INTRAVENOUS at 23:30

## 2020-02-18 RX ADMIN — MORPHINE SULFATE 1 MG: 2 INJECTION, SOLUTION INTRAMUSCULAR; INTRAVENOUS at 23:28

## 2020-02-18 ASSESSMENT — PAIN DESCRIPTION - PROGRESSION
CLINICAL_PROGRESSION: NOT CHANGED

## 2020-02-18 ASSESSMENT — PAIN SCALES - GENERAL
PAINLEVEL_OUTOF10: 4
PAINLEVEL_OUTOF10: 10
PAINLEVEL_OUTOF10: 9
PAINLEVEL_OUTOF10: 9
PAINLEVEL_OUTOF10: 8
PAINLEVEL_OUTOF10: 0
PAINLEVEL_OUTOF10: 8
PAINLEVEL_OUTOF10: 8
PAINLEVEL_OUTOF10: 10

## 2020-02-18 ASSESSMENT — PAIN DESCRIPTION - LOCATION
LOCATION: ABDOMEN

## 2020-02-18 ASSESSMENT — ENCOUNTER SYMPTOMS
SINUS PRESSURE: 0
CHOKING: 0
COUGH: 1
SORE THROAT: 0
EYE DISCHARGE: 0
SINUS PRESSURE: 0
CHEST TIGHTNESS: 0
VOMITING: 0
CONSTIPATION: 0
SINUS PAIN: 0
SHORTNESS OF BREATH: 0
DIARRHEA: 0
EYE ITCHING: 0
VOICE CHANGE: 0
COUGH: 0
TROUBLE SWALLOWING: 0
EYE REDNESS: 0
ABDOMINAL DISTENTION: 0
NAUSEA: 0
ABDOMINAL PAIN: 1
ABDOMINAL PAIN: 1
PHOTOPHOBIA: 0
VOMITING: 1
RHINORRHEA: 0
BLOOD IN STOOL: 1
EYE PAIN: 0
BACK PAIN: 0
WHEEZING: 0

## 2020-02-18 ASSESSMENT — PAIN - FUNCTIONAL ASSESSMENT
PAIN_FUNCTIONAL_ASSESSMENT: ACTIVITIES ARE NOT PREVENTED

## 2020-02-18 ASSESSMENT — PAIN DESCRIPTION - PAIN TYPE
TYPE: ACUTE PAIN

## 2020-02-18 ASSESSMENT — PAIN DESCRIPTION - ONSET
ONSET: ON-GOING

## 2020-02-18 ASSESSMENT — PAIN DESCRIPTION - FREQUENCY
FREQUENCY: CONTINUOUS

## 2020-02-18 ASSESSMENT — PAIN DESCRIPTION - DESCRIPTORS
DESCRIPTORS: DULL;ACHING
DESCRIPTORS: ACHING
DESCRIPTORS: ACHING

## 2020-02-18 ASSESSMENT — PAIN DESCRIPTION - ORIENTATION
ORIENTATION: MID

## 2020-02-18 NOTE — ED NOTES
ED nurse-to-nurse bedside report    Chief Complaint   Patient presents with    Abdominal Pain      LOC: alert and orientated to name, place, date  Vital signs   Vitals:    02/18/20 0523 02/18/20 0524 02/18/20 0548 02/18/20 0654   BP: (!) 181/84  (!) 177/84 (!) 156/92   Pulse:  99 87 87   Resp:  26 18 20   Temp: 98 °F (36.7 °C)      TempSrc: Oral      SpO2:  100% 100% 97%   Weight: 165 lb (74.8 kg)      Height: 5' 4\" (1.626 m)         Pain:    Pain Interventions: morphine  Pain Goal: 2-4  Oxygen: No    Current needs required none   Telemetry: No  LDAs:   Peripheral IV 02/18/20 Left Antecubital (Active)   Site Assessment Clean;Dry; Intact 2/18/2020  6:55 AM   Line Status Infusing 2/18/2020  6:55 AM   Dressing Status Clean;Dry; Intact 2/18/2020  6:55 AM   Dressing Intervention New 2/18/2020  5:28 AM     Continuous Infusions:   Mobility: Independent  Mendoza Fall Risk Score: No flowsheet data found. Fall Interventions: side rails  Report given to: HEATH Caruso.        Michele Cunningham RN  02/18/20 0901

## 2020-02-18 NOTE — ED NOTES
Marielena Fitzpatrick on [address] [facility] notified patient would be coming to the floor     Aaron Mcginnis RN  02/18/20 0652

## 2020-02-18 NOTE — ED NOTES
Pt resting in bed in a right sided position at this time and appears to be resting comfortably at this time. Pt rates pain in abdomen 4/10 at this time. Pt vital signs stable and respirations unlabored. Dr. Casandra Kuhn at bedside. Pt has side rails up x2 and call light in reach.       Jaycob Payan RN  02/18/20 0580

## 2020-02-18 NOTE — H&P
Hospitalist - History & Physical      Patient: Chelsey Stuart    Unit/Bed:24/024A  YOB: 1965  MRN: [id number]   Acct: [de-identified]   PCP: No primary care provider on file. Date of Service: Pt seen/examined on 02/18/20  and Admitted to Inpatient with expected LOS greater than two midnights due to medical therapy. Chief Complaint: Dyspnea. also consult    Assessment and Plan:-  1. Acute colitis-infection versus inflammatory--> check GI panel, added Zosyn IV since patient does not have much response with Flagyl and Cipro. Blood cultures x2, IV fluids clear liquid diet. Add morphine IV 1 mg every 4 hours as needed, Zofran IV every 6 hourly as needed  2. History of allergic rhinitis-on Claritin-continue  3. History of blood pressure benign essential-uncontrolled currently likely secondary to the pain-continue lisinopril for now and hold hydrochlorothiazide, added Norvasc  4. Mild acute hypovolemic hyponatremia-secondary to nausea and vomiting-hold hydrochlorothiazide-IV fluid check BMP a.m.  5. Blood sugars are high-no diagnosis of previous diabetes-add hemoglobin A1c add insulin sliding scale  6. Marijuana current use-counseled to quit  7. 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 Patient was here last night secondary to same complaint and was diagnosed with colitis and was sent home on oral Flagyl and oral Cipro with follow-up appointment with GI as outpatient. However got worse today and she presented to the ER. CT of the abdomen shows worsening colitis. Patient currently in tears sec to pain. Past Medical History:    History reviewed. No pertinent past medical history. Past Surgical History:    History reviewed.  No pertinent surgical history. Home Medications:   No current facility-administered medications on file prior to encounter. Current Outpatient Medications on File Prior to Encounter   Medication Sig Dispense Refill    ciprofloxacin (CIPRO) 500 MG tablet Take 1 tablet by mouth 2 times daily for 10 days 20 tablet 0    metroNIDAZOLE (FLAGYL) 500 MG tablet Take 1 tablet by mouth 2 times daily for 10 days 20 tablet 0    dicyclomine (BENTYL) 10 MG capsule Take 1 capsule by mouth every 6 hours as needed (cramps) 20 capsule 0    ondansetron (ZOFRAN ODT) 4 MG disintegrating tablet Take 1 tablet by mouth every 8 hours as needed for Nausea 20 tablet 0    loperamide (RA ANTI-DIARRHEAL) 2 MG capsule Take 1 capsule by mouth 4 times daily as needed for Diarrhea 20 capsule 0    lisinopril-hydrochlorothiazide (PRINZIDE;ZESTORETIC) 20-25 MG per tablet Take 1 tablet by mouth daily      amoxicillin (AMOXIL) 500 MG capsule Take 1 capsule by mouth 3 times daily for 10 days 30 capsule 0    fluticasone (FLONASE) 50 MCG/ACT nasal spray 1 spray by Each Nostril route daily 1 Bottle 0    loratadine (CLARITIN) 10 MG tablet Take 1 tablet by mouth daily as needed (post nasal drip) 30 tablet 0    ofloxacin (FLOXIN) 0.3 % otic solution Place 10 drops into the right ear 2 times daily for 10 days 1 Bottle 0    metFORMIN (GLUCOPHAGE) 500 MG tablet Take 500 mg by mouth 2 times daily (with meals)         Allergies:    Patient has no known allergies. Social History:    reports that she is a non-smoker but has been exposed to tobacco smoke. She has never used smokeless tobacco. She reports current alcohol use. She reports that she does not use drugs. Family History:   History reviewed. No pertinent family history. Diet:  No diet orders on file    Review of systems:   Pertinent positives as noted in the HPI. All other systems reviewed and negative.     PHYSICAL EXAM:  BP (!) 152/78   Pulse 88   Temp 98 °F (36.7 °C) (Oral)   Resp 18   Ht 5' 4\" (1.626 m)   Wt 165 lb (74.8 kg)   SpO2 99%   BMI 28.32 kg/m²   General appearance: In moderate apparent distress secondary to the abdominal pain, appears stated age and cooperative. HEENT: Normal cephalic, atraumatic without obvious deformity. Pupils equal, round, and reactive to light. Extra ocular muscles intact. Conjunctivae/corneas clear. Neck: Supple, with full range of motion. No jugular venous distention. Trachea midline. Respiratory:  Normal respiratory effort. Clear to auscultation, bilaterally without Rales/Wheezes/Rhonchi. Cardiovascular: Regular rate and rhythm with normal S1/S2 without murmurs, rubs or gallops. Abdomen: Soft, tender in the right lower quadrant and left lower quadrant, non-distended with normal bowel sounds. Musculoskeletal:  No clubbing, cyanosis or edema bilaterally. Skin: Skin color, texture, turgor normal.  No rashes or lesions. Neurologic:  Neurovascularly intact without any focal sensory/motor deficits. Cranial nerves: II-XII intact, grossly non-focal.  Psychiatric: Alert and oriented, thought content appropriate, normal insight  Capillary Refill: Brisk,< 3 seconds   Peripheral Pulses: +2 palpable, equal bilaterally     Labs:   Recent Labs     02/17/20  1400 02/18/20  0528   WBC 9.9 12.2*   HGB 14.7 14.4   HCT 43.9 42.3    333     Recent Labs     02/17/20  1400 02/18/20  0528    134*   K 3.8 3.5    96*   CO2 23 21*   BUN 15 13   CREATININE 0.6 0.6   CALCIUM 9.8 9.3     Recent Labs     02/17/20  1400 02/18/20  0528   AST 17 21   ALT 27 26   BILIDIR  --  <0.2   BILITOT 0.4 0.3   ALKPHOS 102 99     Recent Labs     02/18/20  0528   INR 0.98     No results for input(s): Vu Nacho in the last 72 hours.     Urinalysis:    Lab Results   Component Value Date    NITRU NEGATIVE 02/18/2020    WBCUA 0-2 02/18/2020    BACTERIA NONE SEEN 02/18/2020    RBCUA 3-5 02/18/2020    BLOODU MODERATE 02/18/2020    GLUCOSEU >= 1000 02/18/2020       Radiology:   CT ABDOMEN PELVIS W IV CONTRAST Additional Contrast? None   Final Result   Worsening acute colitis from the mid ascending colon to the splenic flexure. Hepatic steatosis. Small calcified gallstones in the gallbladder. No biliary dilatation. 2 stable indeterminate hypodense splenic lesions. Consider follow-up CT in 6-12 months. Fibroid uterus. Small amount of fluid in the pelvis. **This report has been created using voice recognition software. It may contain minor errors which are inherent in voice recognition technology. **      Final report electronically signed by Dr. Guillermo Ross on 2/18/2020 6:42 AM        Ct Abdomen Pelvis W Iv Contrast Additional Contrast? None    Result Date: 2/18/2020  PROCEDURE: CT ABDOMEN PELVIS W IV CONTRAST CLINICAL INFORMATION: Worsening abdominal pain diagnosed with colitis, cliff rectal bleeding . COMPARISON: Abdomen pelvis CT with contrast dated 2/17/2020 TECHNIQUE: 5 mm axial CT images were obtained through the abdomen and pelvis after the administration of intravenous contrast. Coronal and sagittal reconstructions were obtained. All CT scans at this facility use dose modulation, iterative reconstruction, and/or weight-based dosing when appropriate to reduce radiation dose to as low as reasonably achievable. FINDINGS: Lower chest: There is minimal right lower lobe atelectasis. Liver: There is hepatic steatosis. There is a focal hypodensity in the anterior aspect of the lateral segment left lobe of the liver bordering on the falciform ligament consistent with focal hepatic steatosis. There is no liver mass or intrahepatic biliary dilatation. Gallbladder/Biliary tree: There are small calcified gallstones in the gallbladder. No gallbladder wall thickening. No biliary dilatation. Spleen: Again noted are 2 indeterminate hypodense splenic lesions, largest measuring 1.8 x 1.2 cm. . No splenomegaly. Pancreas: Unremarkable. No mass or pancreatic ductal dilatation.  No findings to suggest acute pancreatitis. Adrenal glands: Unremarkable. No mass. Kidneys and ureters: Unremarkable. No hydroureteronephrosis, mass, or cyst. No renal or ureteral calculi. No findings to suggest acute pyelonephritis. Stomach, small bowel, and colon: Stomach and duodenum are unremarkable. There is worsening thickening of the wall of the colon from the mid ascending colon to the level of the splenic flexure consistent with worsening acute colitis. There is mild colonic diverticulosis without focal diverticulitis. Small bowel is unremarkable. There is no evidence of bowel obstruction. Appendix: The appendix is not visualized and there are radiopaque densities adjacent to the wall of the cecum which may represent prior appendectomy. Omentum and mesentery: Unremarkable Aorta, vascular: No aortic aneurysm or dissection. No significant vascular abnormality. Reproductive: Uterus is heterogeneous consistent with multiple fibroids, some of which are calcified. The adnexa are unremarkable. Bladder: Unremarkable. No wall thickening or obvious mass. No calcified stones. Intraperitoneal/retroperitoneal Space: There is a small amount of fluid in the pelvis. There is no abscess, adenopathy, or mass. No pneumoperitoneum. Abdominal and pelvic body wall soft tissues: There is a small fat-containing umbilical hernia. Musculoskeletal structures: There are mild degenerative changes of the lumbar spine. Worsening acute colitis from the mid ascending colon to the splenic flexure. Hepatic steatosis. Small calcified gallstones in the gallbladder. No biliary dilatation. 2 stable indeterminate hypodense splenic lesions. Consider follow-up CT in 6-12 months. Fibroid uterus. Small amount of fluid in the pelvis. **This report has been created using voice recognition software. It may contain minor errors which are inherent in voice recognition technology. ** Final report electronically signed by Dr. Helen Garcia on 2/18/2020 6:42 AM        EKG: Normal sinus rhythm  Possible Left atrial enlargement  LVH  Abnormal ECG  No previous ECGs available  Confirmed by [facility] Terell STEELE ([id number]) on 2/18/2020 5:03:06 AM    Electronically signed by Pablo Faria MD on 2/18/2020 at 7:56 AM

## 2020-02-18 NOTE — ED NOTES
Patient resting in bed, states pain is better. IV ATB started, call light within reach.  Door shut per patient request .     Xochitl Dumont RN  02/18/20 0637

## 2020-02-18 NOTE — CONSULTS
[address] CONSULT      Patient: Henok Duque  : 1965  Acct#: [de-identified]  Consult seen for:   Henok Duque is a 47 y.o. , female with colitis       ASSESSMENT:     1. Colitis per CT  2. Generalized abdominal pain; not ill appearing at time of consult  3. Abdominal pain   4. Leukocytosis; WBC 12.2  5. Hepatic steatosis   6. DM  7. HTN  8. + cannabinoid       PLAN:   1. CT A/P reports reviewed; see below  2. On Zosyn  3. Follow labs  4. On Lovenox  5. Had colonoscopy last year in [place] and it was normal. Per pt   6. Will probably need another colonoscopy in the near future; [place]  [facility] [place] Naomi Quinteros. Orders called earlier   9. Will follow       CT A/P      Impression   Worsening acute colitis from the mid ascending colon to the splenic flexure. Hepatic steatosis. Small calcified gallstones in the gallbladder. No biliary dilatation. 2 stable indeterminate hypodense splenic lesions. Consider follow-up CT in 6-12 months. Fibroid uterus. Small amount of fluid in the pelvis. HISTORY OF PRESENT ILLNESS     patient lying on cart/cot in ER. Olga Santos that she has generalized abdominal pain that has been going on the last couple days. Was just seen in ER yesterday and had CT A/P. It showed colitis. She was supposed to see DR Naomi Quinteros today she said. She was having abdominal pain and vomiting so she came to the ER again and had another CT done. Pain location generalized   Quality dull currently   Severity  Not rated  on 1-10 scale  Duration few days   Radiation none     Associated s/s: vomiting, small amount of diarrhea         Colonoscopy: last year per pt          PROBLEM LIST    does not have any pertinent problems on file. PAST MEDICAL HISTORY     has no past medical history on file. PAST SURGICAL HISTORY      has no past surgical history on file.       LABS:    CBC:   Recent Labs EYES: IDALIA. ENT:  Ears symmetric, Neck supple, trachea midline, moist mucous membranes     EXTREMITIES:  No cyanosis, clubbing, or edema. DERM:  No rash or jaundice. NEURO:  Alert and oriented x4. Patient moves all extremities and has gross sensation in all extremities. PSYCHIATRIC: calm, pleasant    HOME MEDICATIONS      Prior to Admission medications    Medication Sig Start Date End Date Taking?  Authorizing Provider   ciprofloxacin (CIPRO) 500 MG tablet Take 1 tablet by mouth 2 times daily for 10 days 2/17/20 2/27/20 Yes Melinda Fallon PA-C   metroNIDAZOLE (FLAGYL) 500 MG tablet Take 1 tablet by mouth 2 times daily for 10 days 2/17/20 2/27/20 Yes Melinda Fallon PA-C   dicyclomine (BENTYL) 10 MG capsule Take 1 capsule by mouth every 6 hours as needed (cramps) 2/17/20  Yes Mleinda Fallon PA-C   ondansetron (ZOFRAN ODT) 4 MG disintegrating tablet Take 1 tablet by mouth every 8 hours as needed for Nausea 2/17/20  Yes Melinda Fallon PA-C   loperamide (RA ANTI-DIARRHEAL) 2 MG capsule Take 1 capsule by mouth 4 times daily as needed for Diarrhea 2/17/20 2/22/20 Yes Melinda Fallon PA-C   lisinopril-hydrochlorothiazide (PRINZIDE;ZESTORETIC) 20-25 MG per tablet Take 1 tablet by mouth daily   Yes Historical Provider, MD   amoxicillin (AMOXIL) 500 MG capsule Take 1 capsule by mouth 3 times daily for 10 days 2/10/20 2/20/20 Yes IGNACIO Cazares CNP   fluticasone (FLONASE) 50 MCG/ACT nasal spray 1 spray by Each Nostril route daily 2/10/20  Yes IGNACIO Cazares CNP   loratadine (CLARITIN) 10 MG tablet Take 1 tablet by mouth daily as needed (post nasal drip) 2/10/20  Yes IGNACIO Cazares CNP   ofloxacin (FLOXIN) 0.3 % otic solution Place 10 drops into the right ear 2 times daily for 10 days 2/10/20 2/20/20 Yes IGNACIO Cazares CNP   metFORMIN (GLUCOPHAGE) 500 MG tablet Take 500 mg by mouth 2 times daily (with meals)    Historical Provider, MD       MEDICATIONS    Scheduled consulting physician notes, laboratory results and procedure notes reviewed prior to seeing the patient. Note done in collaboration with DR Mayte Johnson.    Electronically signed by IGNACIO Steele CNP on 2/18/20 at 8:28 AM

## 2020-02-18 NOTE — ED PROVIDER NOTES
Musculoskeletal: Negative for arthralgias, back pain, gait problem, myalgias, neck pain and neck stiffness. Skin: Negative for pallor and rash. Allergic/Immunologic: Negative for immunocompromised state. Neurological: Negative for dizziness, tremors, seizures, syncope, facial asymmetry, weakness, light-headedness, numbness and headaches. Hematological: Negative for adenopathy. Does not bruise/bleed easily. Psychiatric/Behavioral: Negative for agitation, hallucinations and suicidal ideas. The patient is not nervous/anxious. PAST MEDICAL HISTORY    has no past medical history on file. SURGICAL HISTORY      has no past surgical history on file. CURRENT MEDICATIONS       Previous Medications    AMOXICILLIN (AMOXIL) 500 MG CAPSULE    Take 1 capsule by mouth 3 times daily for 10 days    CIPROFLOXACIN (CIPRO) 500 MG TABLET    Take 1 tablet by mouth 2 times daily for 10 days    DICYCLOMINE (BENTYL) 10 MG CAPSULE    Take 1 capsule by mouth every 6 hours as needed (cramps)    FLUTICASONE (FLONASE) 50 MCG/ACT NASAL SPRAY    1 spray by Each Nostril route daily    LISINOPRIL-HYDROCHLOROTHIAZIDE (PRINZIDE;ZESTORETIC) 20-25 MG PER TABLET    Take 1 tablet by mouth daily    LOPERAMIDE (RA ANTI-DIARRHEAL) 2 MG CAPSULE    Take 1 capsule by mouth 4 times daily as needed for Diarrhea    LORATADINE (CLARITIN) 10 MG TABLET    Take 1 tablet by mouth daily as needed (post nasal drip)    METFORMIN (GLUCOPHAGE) 500 MG TABLET    Take 500 mg by mouth 2 times daily (with meals)    METRONIDAZOLE (FLAGYL) 500 MG TABLET    Take 1 tablet by mouth 2 times daily for 10 days    OFLOXACIN (FLOXIN) 0.3 % OTIC SOLUTION    Place 10 drops into the right ear 2 times daily for 10 days    ONDANSETRON (ZOFRAN ODT) 4 MG DISINTEGRATING TABLET    Take 1 tablet by mouth every 8 hours as needed for Nausea       ALLERGIES     has No Known Allergies. FAMILY HISTORY     has no family status information on file.     family history is not on Cranial Nerves: No cranial nerve deficit. Motor: No abnormal muscle tone. Coordination: Coordination normal.      Deep Tendon Reflexes: Reflexes are normal and symmetric. Reflexes normal.   Psychiatric:         Behavior: Behavior normal.         Thought Content: Thought content normal.         Judgment: Judgment normal.           DIFFERENTIAL DIAGNOSIS:   Differential diagnoses were discussed extensively with the patient and family including but no limited to colitis, rectal bleeding, small bowel obstruction, gastric ulcer, hemorrhoids, gastrointestinal bleeding. DIAGNOSTIC RESULTS     EKG: All EKG's are interpreted by the Emergency Department Physician who either signs or Co-signs this chart in the absence of a cardiologist.  EKG interpreted by Johnny Cruz DO:    EKG revealed normal sinus rhythm, normal axis, ventricular rate of 79 WY interval 170 QRS duration of 84 QT interval 396 QTC of 454. No ST segment elevations. RADIOLOGY: non-plain film images(s) such as CT, Ultrasound and MRI are read by the radiologist.    CT ABDOMEN PELVIS W IV CONTRAST Additional Contrast? None   Final Result   Worsening acute colitis from the mid ascending colon to the splenic flexure. Hepatic steatosis. Small calcified gallstones in the gallbladder. No biliary dilatation. 2 stable indeterminate hypodense splenic lesions. Consider follow-up CT in 6-12 months. Fibroid uterus. Small amount of fluid in the pelvis. **This report has been created using voice recognition software. It may contain minor errors which are inherent in voice recognition technology. **      Final report electronically signed by Dr. Saul Torres on 2/18/2020 6:42 AM          LABS:   Labs Reviewed   CBC WITH AUTO DIFFERENTIAL - Abnormal; Notable for the following components:       Result Value    WBC 12.2 (*)     All other components within normal limits   BASIC METABOLIC PANEL - Abnormal; Notable for the following stable. Her CT examination repeated tonight showed a worsening colitis. At this point I feel the patient need to be admitted. I called the hospitalist service and discussed the case with them. They graciously accepted the admission. I then called the patient's gastroenterologist and discussed case with Dr. Soraida De Luna. He requested that we admit the patient and placed him on Cipro and Flagyl here. He will see the patient in consult. I went back and discussed this with the patient who understood and agreed with the plan. Patient is subsequently admitted in stable condition pending further evaluation and treatment. CRITICALCARE:   None    CONSULTS:  Dr. Echols Graft:  None    FINAL IMPRESSION      1. Colitis, acute          DISPOSITION/PLAN   Admission    PATIENT REFERRED TO:  No follow-up provider specified. DISCHARGE MEDICATIONS:  New Prescriptions    No medications on file       (Please note that portions of this note were completed with a voice recognition program.  Efforts weremade to edit the dictations but occasionally words are mis-transcribed.)    Scribe:  Chester Jordan 2/18/20 5:29 AM Scribing for and in the presence ofDequan Love DO. Scribe: Chester Jordan 2/18/20 5:29 AM    Provider:  I personally performed the services described in the documentation, reviewed and edited the documentation which was dictated to the scribe inmy presence, and it accurately records my words and actions.     Johnny Cruz DO 2/18/20 7:13 AM        Johnny Cruz DO  02/18/20 0666

## 2020-02-19 ENCOUNTER — ANESTHESIA EVENT (OUTPATIENT)
Dept: ENDOSCOPY | Age: 55
DRG: 392 | End: 2020-02-19
Payer: COMMERCIAL

## 2020-02-19 ENCOUNTER — ANESTHESIA (OUTPATIENT)
Dept: ENDOSCOPY | Age: 55
DRG: 392 | End: 2020-02-19
Payer: COMMERCIAL

## 2020-02-19 VITALS
BODY MASS INDEX: 27.96 KG/M2 | SYSTOLIC BLOOD PRESSURE: 182 MMHG | HEART RATE: 85 BPM | TEMPERATURE: 98.7 F | OXYGEN SATURATION: 99 % | WEIGHT: 163.8 LBS | RESPIRATION RATE: 16 BRPM | HEIGHT: 64 IN | DIASTOLIC BLOOD PRESSURE: 86 MMHG

## 2020-02-19 VITALS
DIASTOLIC BLOOD PRESSURE: 52 MMHG | OXYGEN SATURATION: 100 % | SYSTOLIC BLOOD PRESSURE: 95 MMHG | RESPIRATION RATE: 13 BRPM

## 2020-02-19 LAB
ANION GAP SERPL CALCULATED.3IONS-SCNC: 11 MEQ/L (ref 8–16)
ATYPICAL LYMPHOCYTES: ABNORMAL %
BASOPHILS # BLD: 0.4 %
BASOPHILS ABSOLUTE: 0 THOU/MM3 (ref 0–0.1)
BUN BLDV-MCNC: 6 MG/DL (ref 7–22)
CALCIUM SERPL-MCNC: 8.3 MG/DL (ref 8.5–10.5)
CHLORIDE BLD-SCNC: 104 MEQ/L (ref 98–111)
CO2: 23 MEQ/L (ref 23–33)
CREAT SERPL-MCNC: 0.5 MG/DL (ref 0.4–1.2)
DIFFERENTIAL TYPE: ABNORMAL
EOSINOPHIL # BLD: 1.2 %
EOSINOPHILS ABSOLUTE: 0.1 THOU/MM3 (ref 0–0.4)
ERYTHROCYTE [DISTWIDTH] IN BLOOD BY AUTOMATED COUNT: 13 % (ref 11.5–14.5)
ERYTHROCYTE [DISTWIDTH] IN BLOOD BY AUTOMATED COUNT: 42.3 FL (ref 35–45)
GFR SERPL CREATININE-BSD FRML MDRD: > 90 ML/MIN/1.73M2
GLUCOSE BLD-MCNC: 172 MG/DL (ref 70–108)
HCT VFR BLD CALC: 35.2 % (ref 37–47)
HEMOGLOBIN: 11.9 GM/DL (ref 12–16)
IMMATURE GRANS (ABS): 0.02 THOU/MM3 (ref 0–0.07)
IMMATURE GRANULOCYTES: 0.2 %
LYMPHOCYTES # BLD: 55.9 %
LYMPHOCYTES ABSOLUTE: 5.4 THOU/MM3 (ref 1–4.8)
MAGNESIUM: 1.9 MG/DL (ref 1.6–2.4)
MCH RBC QN AUTO: 30.7 PG (ref 26–33)
MCHC RBC AUTO-ENTMCNC: 33.8 GM/DL (ref 32.2–35.5)
MCV RBC AUTO: 91 FL (ref 81–99)
MONOCYTES # BLD: 4.7 %
MONOCYTES ABSOLUTE: 0.5 THOU/MM3 (ref 0.4–1.3)
NUCLEATED RED BLOOD CELLS: 0 /100 WBC
PATHOLOGIST REVIEW: ABNORMAL
PLATELET # BLD: 250 THOU/MM3 (ref 130–400)
PLATELET ESTIMATE: ADEQUATE
PMV BLD AUTO: 10 FL (ref 9.4–12.4)
POTASSIUM REFLEX MAGNESIUM: 3.3 MEQ/L (ref 3.5–5.2)
RBC # BLD: 3.87 MILL/MM3 (ref 4.2–5.4)
SCAN OF BLOOD SMEAR: NORMAL
SEG NEUTROPHILS: 37.6 %
SEGMENTED NEUTROPHILS ABSOLUTE COUNT: 3.6 THOU/MM3 (ref 1.8–7.7)
SODIUM BLD-SCNC: 138 MEQ/L (ref 135–145)
WBC # BLD: 9.7 THOU/MM3 (ref 4.8–10.8)

## 2020-02-19 PROCEDURE — 7100000000 HC PACU RECOVERY - FIRST 15 MIN: Performed by: INTERNAL MEDICINE

## 2020-02-19 PROCEDURE — 83735 ASSAY OF MAGNESIUM: CPT

## 2020-02-19 PROCEDURE — 93010 ELECTROCARDIOGRAM REPORT: CPT | Performed by: INTERNAL MEDICINE

## 2020-02-19 PROCEDURE — 88305 TISSUE EXAM BY PATHOLOGIST: CPT

## 2020-02-19 PROCEDURE — 80048 BASIC METABOLIC PNL TOTAL CA: CPT

## 2020-02-19 PROCEDURE — 6360000002 HC RX W HCPCS: Performed by: NURSE ANESTHETIST, CERTIFIED REGISTERED

## 2020-02-19 PROCEDURE — 6370000000 HC RX 637 (ALT 250 FOR IP): Performed by: INTERNAL MEDICINE

## 2020-02-19 PROCEDURE — 6360000002 HC RX W HCPCS: Performed by: INTERNAL MEDICINE

## 2020-02-19 PROCEDURE — 2580000003 HC RX 258: Performed by: INTERNAL MEDICINE

## 2020-02-19 PROCEDURE — 2709999900 HC NON-CHARGEABLE SUPPLY: Performed by: INTERNAL MEDICINE

## 2020-02-19 PROCEDURE — 3700000000 HC ANESTHESIA ATTENDED CARE: Performed by: INTERNAL MEDICINE

## 2020-02-19 PROCEDURE — 7100000001 HC PACU RECOVERY - ADDTL 15 MIN: Performed by: INTERNAL MEDICINE

## 2020-02-19 PROCEDURE — 36415 COLL VENOUS BLD VENIPUNCTURE: CPT

## 2020-02-19 PROCEDURE — 2500000003 HC RX 250 WO HCPCS: Performed by: NURSE ANESTHETIST, CERTIFIED REGISTERED

## 2020-02-19 PROCEDURE — 3700000001 HC ADD 15 MINUTES (ANESTHESIA): Performed by: INTERNAL MEDICINE

## 2020-02-19 PROCEDURE — 85025 COMPLETE CBC W/AUTO DIFF WBC: CPT

## 2020-02-19 PROCEDURE — 0DBE8ZX EXCISION OF LARGE INTESTINE, VIA NATURAL OR ARTIFICIAL OPENING ENDOSCOPIC, DIAGNOSTIC: ICD-10-PCS | Performed by: INTERNAL MEDICINE

## 2020-02-19 PROCEDURE — 99238 HOSP IP/OBS DSCHRG MGMT 30/<: CPT | Performed by: INTERNAL MEDICINE

## 2020-02-19 PROCEDURE — 6360000002 HC RX W HCPCS: Performed by: PHYSICIAN ASSISTANT

## 2020-02-19 PROCEDURE — 6370000000 HC RX 637 (ALT 250 FOR IP): Performed by: PHYSICIAN ASSISTANT

## 2020-02-19 PROCEDURE — 3609010600 HC COLONOSCOPY POLYPECTOMY SNARE/COLD BIOPSY: Performed by: INTERNAL MEDICINE

## 2020-02-19 RX ORDER — LANOLIN ALCOHOL/MO/W.PET/CERES
3 CREAM (GRAM) TOPICAL NIGHTLY PRN
Status: DISCONTINUED | OUTPATIENT
Start: 2020-02-19 | End: 2020-02-19 | Stop reason: HOSPADM

## 2020-02-19 RX ORDER — LIDOCAINE HYDROCHLORIDE 20 MG/ML
INJECTION, SOLUTION INFILTRATION; PERINEURAL PRN
Status: DISCONTINUED | OUTPATIENT
Start: 2020-02-19 | End: 2020-02-19 | Stop reason: SDUPTHER

## 2020-02-19 RX ORDER — PROPOFOL 10 MG/ML
INJECTION, EMULSION INTRAVENOUS PRN
Status: DISCONTINUED | OUTPATIENT
Start: 2020-02-19 | End: 2020-02-19 | Stop reason: SDUPTHER

## 2020-02-19 RX ORDER — LACTOBACILLUS RHAMNOSUS GG 10B CELL
1 CAPSULE ORAL
Qty: 30 CAPSULE | Refills: 0 | Status: SHIPPED | OUTPATIENT
Start: 2020-02-20

## 2020-02-19 RX ORDER — LACTOBACILLUS RHAMNOSUS GG 10B CELL
1 CAPSULE ORAL
Status: DISCONTINUED | OUTPATIENT
Start: 2020-02-20 | End: 2020-02-19 | Stop reason: HOSPADM

## 2020-02-19 RX ADMIN — PROPOFOL 100 MG: 10 INJECTION, EMULSION INTRAVENOUS at 12:02

## 2020-02-19 RX ADMIN — AMLODIPINE BESYLATE 5 MG: 5 TABLET ORAL at 09:46

## 2020-02-19 RX ADMIN — LIDOCAINE HYDROCHLORIDE 5 ML: 20 INJECTION, SOLUTION INFILTRATION; PERINEURAL at 12:02

## 2020-02-19 RX ADMIN — MORPHINE SULFATE 1 MG: 2 INJECTION, SOLUTION INTRAMUSCULAR; INTRAVENOUS at 02:31

## 2020-02-19 RX ADMIN — PROPOFOL 100 MG: 10 INJECTION, EMULSION INTRAVENOUS at 12:10

## 2020-02-19 RX ADMIN — PIPERACILLIN AND TAZOBACTAM 3.38 G: 3; .375 INJECTION, POWDER, FOR SOLUTION INTRAVENOUS at 06:56

## 2020-02-19 RX ADMIN — MORPHINE SULFATE 1 MG: 2 INJECTION, SOLUTION INTRAMUSCULAR; INTRAVENOUS at 05:40

## 2020-02-19 RX ADMIN — POTASSIUM CHLORIDE 40 MEQ: 1500 TABLET, EXTENDED RELEASE ORAL at 06:43

## 2020-02-19 RX ADMIN — SODIUM CHLORIDE: 9 INJECTION, SOLUTION INTRAVENOUS at 04:37

## 2020-02-19 RX ADMIN — Medication 3 MG: at 00:24

## 2020-02-19 RX ADMIN — PROPOFOL 50 MG: 10 INJECTION, EMULSION INTRAVENOUS at 12:16

## 2020-02-19 RX ADMIN — CETIRIZINE HYDROCHLORIDE 5 MG: 10 TABLET, FILM COATED ORAL at 09:46

## 2020-02-19 RX ADMIN — LISINOPRIL 20 MG: 20 TABLET ORAL at 09:45

## 2020-02-19 RX ADMIN — ENOXAPARIN SODIUM 40 MG: 40 INJECTION SUBCUTANEOUS at 09:46

## 2020-02-19 ASSESSMENT — PAIN SCALES - GENERAL
PAINLEVEL_OUTOF10: 7
PAINLEVEL_OUTOF10: 0
PAINLEVEL_OUTOF10: 7
PAINLEVEL_OUTOF10: 0

## 2020-02-19 ASSESSMENT — PAIN DESCRIPTION - PAIN TYPE
TYPE: ACUTE PAIN
TYPE: ACUTE PAIN

## 2020-02-19 ASSESSMENT — PAIN DESCRIPTION - ONSET
ONSET: ON-GOING
ONSET: ON-GOING

## 2020-02-19 ASSESSMENT — PAIN DESCRIPTION - PROGRESSION
CLINICAL_PROGRESSION: NOT CHANGED
CLINICAL_PROGRESSION: NOT CHANGED

## 2020-02-19 ASSESSMENT — PAIN DESCRIPTION - LOCATION
LOCATION: ABDOMEN
LOCATION: ABDOMEN

## 2020-02-19 ASSESSMENT — PAIN DESCRIPTION - FREQUENCY
FREQUENCY: CONTINUOUS
FREQUENCY: CONTINUOUS

## 2020-02-19 ASSESSMENT — PAIN DESCRIPTION - DESCRIPTORS
DESCRIPTORS: ACHING
DESCRIPTORS: ACHING

## 2020-02-19 ASSESSMENT — PAIN - FUNCTIONAL ASSESSMENT
PAIN_FUNCTIONAL_ASSESSMENT: ACTIVITIES ARE NOT PREVENTED
PAIN_FUNCTIONAL_ASSESSMENT: 0-10
PAIN_FUNCTIONAL_ASSESSMENT: ACTIVITIES ARE NOT PREVENTED

## 2020-02-19 ASSESSMENT — PAIN DESCRIPTION - ORIENTATION
ORIENTATION: MID;UPPER
ORIENTATION: MID;UPPER

## 2020-02-19 NOTE — FLOWSHEET NOTE
02/18/20 2247   Provider Notification   Reason for Communication Evaluate   Provider Name [facility]   Provider Notification Physician Assistant   Method of Communication Secure Message   Response Waiting for response   Notification Time [phone]     Notified of patient's requesting sleep aid and pain medicine for breakthrough pain. Waiting for response. 2330: Order for morphine 1mg IV Q3H PRN. Order for melatonin 3mg HS PRN. 2330: notified PA of patient's elevated blood pressure. No new orders.

## 2020-02-19 NOTE — BRIEF OP NOTE
Brief Postoperative Note  ______________________________________________________________    Patient: Mauro Girard  YOB: 1965  MRN: [id number]  Date of Procedure: 2/19/2020    Pre-Op Diagnosis: COLITIS    Post-Op Diagnosis: Same       Procedure(s):  COLONOSCOPY POLYPECTOMY SNARE/COLD BIOPSY    Anesthesia: Monitor Anesthesia Care    Surgeon(s):  Master Sánchez MD    Assistant: yes    Estimated Blood Loss (mL): less than 50     Complications: None    Specimens:   ID Type Source Tests Collected by Time Destination   A : RANDOM COLON BX'S R/O MICROSCOPIC COLITIS  Tissue Colon SURGICAL PATHOLOGY Master Sánchez MD 2/19/2020 1214    B : RANDOM COLON POLYPS Tissue Colon SURGICAL PATHOLOGY Master Sáncehz MD 2/19/2020 1219        Implants:  * No implants in log *      Drains: * No LDAs found *    Findings: polyp bx    Master Sánchez MD  Date: 2/19/2020  Time: 12:31 PM

## 2020-02-19 NOTE — CARE COORDINATION
No  Does patient want to participate in local refill/ meds to beds program?  No  Type of Home Care Services:  None  Patient expects to be discharged to:  home  Expected Discharge date:  02/20/20  Follow Up Appointment: Best Day/ Time: Monday AM    Patient Goals/Plan/Treatment Preferences: Met with pt today. She is from home with spouse. They are self sufficient, basic needs are met, denies issues getting meds. She has no home services or DME. She needs a PCP and prefers one affiliated with [facility]. She has no transportation problems. No discharge needs voiced presently. PCP appt scheduled for pt to see Mónica Watts CNP on Tues Feb 25 at 10:20. Transportation/Food Security/Housekeeping Addressed:  No issues identified.     Evaluation: no

## 2020-02-19 NOTE — PROCEDURES
total  intravenous anesthesia was given by Anesthesia; the PCF-H190AL pediatric  colonoscope was inserted into the rectum and advanced up to the cecum  without any difficulty and terminal ileum intubated. After withdrawal of the scope, the terminal ileum looks normal as shown  in picture #A2. Appendiceal orifice and cecum look normal as shown in  picture #A1. Ascending colon looks normal as shown in picture #A3. Transverse colon looks normal as shown in picture #A4 and #A5. Descending colon looks normal as shown in picture #A6. In the sigmoid  colon, one small polyp, removed by cold biopsy. On retroflex, rectum  showed small internal hemorrhoids as shown in picture #8. Random  biopsies obtained throughout the colon to rule out microscopic colitis. Air was withdrawn as the scope was removed. The patient tolerated the  procedure well without any immediate complications. Vitals including  blood pressure, heart rate and pulse ox were stable during the procedure  and after the procedure. The patient was transferred to the recovery  room in stable condition. IMPRESSION:  Colonoscopy to distal ileum. No colitis noted. Most  likely the patient had false positive CT scan. Random biopsies obtained  to rule out microscopic colitis. One small polyp in the sigmoid colon,  removed by cold biopsy. RECOMMENDATIONS:  Stop the Zosyn. Start the patient on clear liquids,  advance as tolerated to regular diet. Fiber supplements. Probiotics  remain stable, okay to discharge home. Follow up with me in two to  three weeks. Followup colonoscopy in five years pending the biopsy  results. Thank you Dr. Barry Mcginnis, for letting me to do procedure on this  patient. Do not hesitate to call me if you have any questions. My  recommendations are above.         Edu Woods M.D.    D: 02/19/2020 12:36:15       T: 02/19/2020 12:43:17     [email or web address]  Job#: [id number]     Doc#: [id number]    CC:  Darylene Kidd,

## 2020-02-19 NOTE — OP NOTE
[address] [address] Endoscopy    CONSCIOUS SEDATION      Patient: Tony Leahy  : 1965  Acct#: [de-identified]    PLANNED PROCEDURE   []EGD  [x]Colonoscopy []Flex Sigmoid  []Other:  Indication: Pain control for GI procedure    Consent: I have discussed with the patient and/or the patient representative the indication, alternatives, and the possible risks and/or complications of the planned procedure and the anesthesia methods. The patient and/or patient representative appear to understand and agree to proceed. Pre-Sedation Documentation and Exam: I have personally completed a history, physical exam & review of systems for this patient (see notes). Airway Assessment: mac    Prior History of Anesthesia Complications: mac    ASA Classification: mac    Sedation/ Anesthesia Plan: mac      SEDATION  Planned agent:[x]Midazolam []Meperidine [x]Sublimaze []Morphine    Monitoring and Safety: The patient will be placed on a cardiac monitor and vital signs, pulse oximetry and level of consciousness will be continuously evaluated throughout the procedure. The patient will be closely monitored until recovery from the medications is complete and the patient has returned to baseline status. Respiratory therapy will be on standby during the procedure. I have reviewed with the patient and/or family the risks, benefits, and alternatives to the procedure.     Antwan Devries MD, [address]  2020, 12:30 PM    Post-Sedation Vital Signs: Vital signs were reviewed and were stable after the procedure (see flow sheet for vitals)            Post-Sedation Exam: Lungs: clear           Complications: none     Antwan Devries MD, CNSP  2020,

## 2020-02-19 NOTE — PROGRESS NOTES
Pt arrived to [facility] for colonoscopy, consent signed.
Report called to [address] [place] [address] nurse.
contributed to goal setting.

## 2020-03-01 NOTE — DISCHARGE SUMMARY
technology. **      Final report electronically signed by Dr. Marino Romano on 2/18/2020 6:42 AM             Consults:     STR ED TO IP CONSULT  STR ED TO IP CONSULT    Disposition: Home  Condition at Discharge: Stable    Code Status:  Prior     Patient Instructions:    Discharge lab work: none  Activity: activity as tolerated  Diet: No diet orders on file      Follow-up visits:   Ketty Vazquez, APRN - CNP  [address] [address]  [phone]    On 2/25/2020  See Ketty Vazquez on Tues Feb 25 at 10:05 (for a 10:20 appt). Take ID, insurance info and all medications. Norm Benavides MD  [address] [phone]      follow up in 2-3 weeks. please call office tomorrow as they are closed at this time         Discharge Medications:      Andrey Rough   Home Medication Instructions OCE:[id number]    Printed on:03/01/20 9089   Medication Information                      dicyclomine (BENTYL) 10 MG capsule  Take 1 capsule by mouth every 6 hours as needed (cramps)             fluticasone (FLONASE) 50 MCG/ACT nasal spray  1 spray by Each Nostril route daily             lactobacillus (CULTURELLE) capsule  Take 1 capsule by mouth daily (with breakfast)             lisinopril-hydrochlorothiazide (PRINZIDE;ZESTORETIC) 20-25 MG per tablet  Take 1 tablet by mouth daily             loratadine (CLARITIN) 10 MG tablet  Take 1 tablet by mouth daily as needed (post nasal drip)             metFORMIN (GLUCOPHAGE) 500 MG tablet  Take 500 mg by mouth 2 times daily (with meals)             ondansetron (ZOFRAN ODT) 4 MG disintegrating tablet  Take 1 tablet by mouth every 8 hours as needed for Nausea                 Time Spent on discharge is more than 30 minutes in the examination, evaluation, counseling and review of medications and discharge plan. Signed: Thank you No primary care provider on file. for the opportunity to be involved in this patient's care.     Electronically signed by Rufus Hills Ramesh Modi MD on 3/1/2020 at 4:54 PM

## 2022-07-25 ENCOUNTER — APPOINTMENT (OUTPATIENT)
Dept: URBAN - METROPOLITAN AREA CLINIC 243 | Age: 57
Setting detail: DERMATOLOGY
End: 2022-07-25

## 2022-07-25 DIAGNOSIS — Z12.83 ENCOUNTER FOR SCREENING FOR MALIGNANT NEOPLASM OF SKIN: ICD-10-CM

## 2022-07-25 DIAGNOSIS — L82.1 OTHER SEBORRHEIC KERATOSIS: ICD-10-CM

## 2022-07-25 DIAGNOSIS — D22 MELANOCYTIC NEVI: ICD-10-CM

## 2022-07-25 DIAGNOSIS — L81.4 OTHER MELANIN HYPERPIGMENTATION: ICD-10-CM

## 2022-07-25 PROBLEM — D48.5 NEOPLASM OF UNCERTAIN BEHAVIOR OF SKIN: Status: ACTIVE | Noted: 2022-07-25

## 2022-07-25 PROBLEM — D22.5 MELANOCYTIC NEVI OF TRUNK: Status: ACTIVE | Noted: 2022-07-25

## 2022-07-25 PROCEDURE — OTHER MIPS QUALITY: OTHER

## 2022-07-25 PROCEDURE — OTHER COUNSELING: OTHER

## 2022-07-25 PROCEDURE — 11301 SHAVE SKIN LESION 0.6-1.0 CM: CPT

## 2022-07-25 PROCEDURE — 99203 OFFICE O/P NEW LOW 30 MIN: CPT | Mod: 25

## 2022-07-25 PROCEDURE — OTHER SHAVE REMOVAL: OTHER

## 2022-07-25 PROCEDURE — A4550 SURGICAL TRAYS: HCPCS

## 2022-07-25 ASSESSMENT — LOCATION ZONE DERM
LOCATION ZONE: ARM
LOCATION ZONE: TRUNK

## 2022-07-25 ASSESSMENT — LOCATION SIMPLE DESCRIPTION DERM
LOCATION SIMPLE: RIGHT POSTERIOR UPPER ARM
LOCATION SIMPLE: ABDOMEN
LOCATION SIMPLE: UPPER BACK
LOCATION SIMPLE: LEFT UPPER BACK

## 2022-07-25 ASSESSMENT — LOCATION DETAILED DESCRIPTION DERM
LOCATION DETAILED: RIGHT DISTAL POSTERIOR UPPER ARM
LOCATION DETAILED: LEFT SUPERIOR UPPER BACK
LOCATION DETAILED: EPIGASTRIC SKIN
LOCATION DETAILED: LEFT MEDIAL UPPER BACK
LOCATION DETAILED: SUPERIOR THORACIC SPINE

## 2022-07-25 NOTE — PROCEDURE: SHAVE REMOVAL
Anesthesia Type: 1% lidocaine with epinephrine
Notification Instructions: Patient will be notified of pathology results. However, patient instructed to call the office if not contacted within 2 weeks.
Billing Type: Third-Party Bill
Detail Level: Detailed
Consent was obtained from the patient. The risks and benefits to therapy were discussed in detail. Specifically, the risks of infection, scarring, bleeding, prolonged wound healing, incomplete removal, allergy to anesthesia, nerve injury and recurrence were addressed. Prior to the procedure, the treatment site was clearly identified and confirmed by the patient. All components of Universal Protocol/PAUSE Rule completed.
Hemostasis: Pressure
Path Notes (To The Dermatopathologist): Check margins
Medical Necessity Information: It is in your best interest to select a reason for this procedure from the list below. All of these items fulfill various CMS LCD requirements except the new and changing color options.
Render Post-Care Instructions In Note?: yes
Render Path Notes In Note?: No
Size Of Lesion In Cm (Required): 0.8
Post-Care Instructions: I reviewed with the patient in detail post-care instructions. Patient is to keep the biopsy site dry overnight, and then apply vaseline twice daily until healed.
Medical Necessity Clause: This procedure was medically necessary because the lesion that was treated was:
Biopsy Method: Personna blade
X Size Of Lesion In Cm (Optional): 0
Anesthesia Volume In Cc: 2
Wound Care: Petrolatum

## 2024-08-22 ENCOUNTER — APPOINTMENT (OUTPATIENT)
Dept: URBAN - METROPOLITAN AREA CLINIC 243 | Age: 59
Setting detail: DERMATOLOGY
End: 2024-08-23

## 2024-08-22 DIAGNOSIS — Z71.89 OTHER SPECIFIED COUNSELING: ICD-10-CM

## 2024-08-22 DIAGNOSIS — L81.4 OTHER MELANIN HYPERPIGMENTATION: ICD-10-CM

## 2024-08-22 DIAGNOSIS — B07.8 OTHER VIRAL WARTS: ICD-10-CM

## 2024-08-22 DIAGNOSIS — D22 MELANOCYTIC NEVI: ICD-10-CM

## 2024-08-22 DIAGNOSIS — Z12.83 ENCOUNTER FOR SCREENING FOR MALIGNANT NEOPLASM OF SKIN: ICD-10-CM

## 2024-08-22 DIAGNOSIS — L82.0 INFLAMED SEBORRHEIC KERATOSIS: ICD-10-CM

## 2024-08-22 DIAGNOSIS — L82.1 OTHER SEBORRHEIC KERATOSIS: ICD-10-CM

## 2024-08-22 PROBLEM — D22.5 MELANOCYTIC NEVI OF TRUNK: Status: ACTIVE | Noted: 2024-08-22

## 2024-08-22 PROCEDURE — OTHER COUNSELING: OTHER

## 2024-08-22 PROCEDURE — OTHER LIQUID NITROGEN: OTHER

## 2024-08-22 PROCEDURE — OTHER TREATMENT REGIMEN: OTHER

## 2024-08-22 PROCEDURE — OTHER REASSURANCE: OTHER

## 2024-08-22 PROCEDURE — OTHER MIPS QUALITY: OTHER

## 2024-08-22 PROCEDURE — 17110 DESTRUCT B9 LESION 1-14: CPT

## 2024-08-22 PROCEDURE — 99213 OFFICE O/P EST LOW 20 MIN: CPT | Mod: 25

## 2024-08-22 PROCEDURE — OTHER PRESCRIPTION: OTHER

## 2024-08-22 ASSESSMENT — LOCATION ZONE DERM
LOCATION ZONE: FACE
LOCATION ZONE: TRUNK
LOCATION ZONE: FEET

## 2024-08-22 ASSESSMENT — LOCATION DETAILED DESCRIPTION DERM
LOCATION DETAILED: RIGHT PLANTAR FOREFOOT OVERLYING 2ND METATARSAL
LOCATION DETAILED: SUBXIPHOID
LOCATION DETAILED: LEFT LATERAL ABDOMEN
LOCATION DETAILED: XIPHOID
LOCATION DETAILED: RIGHT INFERIOR MEDIAL UPPER BACK
LOCATION DETAILED: LEFT INFERIOR CENTRAL MALAR CHEEK
LOCATION DETAILED: RIGHT INFERIOR MEDIAL LOWER BACK
LOCATION DETAILED: RIGHT INFERIOR MEDIAL MIDBACK
LOCATION DETAILED: LEFT MEDIAL BREAST 8-9:00 REGION
LOCATION DETAILED: LEFT RIB CAGE
LOCATION DETAILED: LEFT INFRAMAMMARY CREASE (INNER QUADRANT)
LOCATION DETAILED: INFERIOR THORACIC SPINE
LOCATION DETAILED: RIGHT LATERAL BREAST 6-7:00 REGION
LOCATION DETAILED: RIGHT SUPERIOR MEDIAL LOWER BACK
LOCATION DETAILED: RIGHT SUPERIOR FOREHEAD

## 2024-08-22 ASSESSMENT — LOCATION SIMPLE DESCRIPTION DERM
LOCATION SIMPLE: RIGHT FOREHEAD
LOCATION SIMPLE: LEFT BREAST
LOCATION SIMPLE: RIGHT UPPER BACK
LOCATION SIMPLE: ABDOMEN
LOCATION SIMPLE: RIGHT LOWER BACK
LOCATION SIMPLE: RIGHT PLANTAR SURFACE
LOCATION SIMPLE: RIGHT BREAST
LOCATION SIMPLE: LEFT CHEEK
LOCATION SIMPLE: UPPER BACK

## 2024-08-22 NOTE — HPI: FULL BODY SKIN EXAMINATION
What Type Of Note Output Would You Prefer (Optional)?: Bullet Format
What Is The Reason For Today's Visit?: Full Body Skin Examination
What Is The Reason For Today's Visit? (Being Monitored For X): concerning skin lesions on an annual basis
1

## (undated) DEVICE — 4-PORT MANIFOLD: Brand: NEPTUNE 2

## (undated) DEVICE — SET LNR RED GRN W/ BASE CLEANASCOPE

## (undated) DEVICE — KIT INF CTRL 2OZ LUB TBNG L12FT DBL END BRSH SYR OP4

## (undated) DEVICE — SOLUTION IV IRRIG WATER 500ML POUR BRL ST 2F7113